# Patient Record
Sex: FEMALE | Race: WHITE | NOT HISPANIC OR LATINO | URBAN - METROPOLITAN AREA
[De-identification: names, ages, dates, MRNs, and addresses within clinical notes are randomized per-mention and may not be internally consistent; named-entity substitution may affect disease eponyms.]

---

## 2017-09-15 ENCOUNTER — INPATIENT (INPATIENT)
Facility: HOSPITAL | Age: 82
LOS: 2 days | Discharge: HOME | End: 2017-09-18
Attending: INTERNAL MEDICINE | Admitting: INTERNAL MEDICINE
Payer: MEDICARE

## 2017-09-15 VITALS
RESPIRATION RATE: 16 BRPM | SYSTOLIC BLOOD PRESSURE: 142 MMHG | HEART RATE: 86 BPM | OXYGEN SATURATION: 97 % | TEMPERATURE: 97 F | DIASTOLIC BLOOD PRESSURE: 80 MMHG

## 2017-09-15 DIAGNOSIS — Z98.891 HISTORY OF UTERINE SCAR FROM PREVIOUS SURGERY: Chronic | ICD-10-CM

## 2017-09-15 DIAGNOSIS — K52.9 NONINFECTIVE GASTROENTERITIS AND COLITIS, UNSPECIFIED: ICD-10-CM

## 2017-09-15 LAB
ALBUMIN SERPL ELPH-MCNC: 3.8 G/DL — SIGNIFICANT CHANGE UP (ref 3.3–5)
ALP SERPL-CCNC: 42 U/L — SIGNIFICANT CHANGE UP (ref 40–120)
ALT FLD-CCNC: 15 U/L — SIGNIFICANT CHANGE UP (ref 4–33)
APPEARANCE UR: CLEAR — SIGNIFICANT CHANGE UP
APTT BLD: 40.7 SEC — HIGH (ref 27.5–37.4)
AST SERPL-CCNC: 25 U/L — SIGNIFICANT CHANGE UP (ref 4–32)
BACTERIA # UR AUTO: SIGNIFICANT CHANGE UP
BASE EXCESS BLDV CALC-SCNC: 0.9 MMOL/L — SIGNIFICANT CHANGE UP
BASOPHILS # BLD AUTO: 0.05 K/UL — SIGNIFICANT CHANGE UP (ref 0–0.2)
BASOPHILS NFR BLD AUTO: 0.4 % — SIGNIFICANT CHANGE UP (ref 0–2)
BILIRUB SERPL-MCNC: 0.4 MG/DL — SIGNIFICANT CHANGE UP (ref 0.2–1.2)
BILIRUB UR-MCNC: NEGATIVE — SIGNIFICANT CHANGE UP
BLOOD GAS VENOUS - CREATININE: 0.67 MG/DL — SIGNIFICANT CHANGE UP (ref 0.5–1.3)
BLOOD UR QL VISUAL: NEGATIVE — SIGNIFICANT CHANGE UP
BUN SERPL-MCNC: 16 MG/DL — SIGNIFICANT CHANGE UP (ref 7–23)
CALCIUM SERPL-MCNC: 8.7 MG/DL — SIGNIFICANT CHANGE UP (ref 8.4–10.5)
CHLORIDE BLDV-SCNC: 104 MMOL/L — SIGNIFICANT CHANGE UP (ref 96–108)
CHLORIDE SERPL-SCNC: 100 MMOL/L — SIGNIFICANT CHANGE UP (ref 98–107)
CO2 SERPL-SCNC: 23 MMOL/L — SIGNIFICANT CHANGE UP (ref 22–31)
COLOR SPEC: YELLOW — SIGNIFICANT CHANGE UP
CREAT SERPL-MCNC: 0.75 MG/DL — SIGNIFICANT CHANGE UP (ref 0.5–1.3)
EOSINOPHIL # BLD AUTO: 0.18 K/UL — SIGNIFICANT CHANGE UP (ref 0–0.5)
EOSINOPHIL NFR BLD AUTO: 1.5 % — SIGNIFICANT CHANGE UP (ref 0–6)
GAS PNL BLDV: 133 MMOL/L — LOW (ref 136–146)
GLUCOSE BLDV-MCNC: 115 — HIGH (ref 70–99)
GLUCOSE SERPL-MCNC: 112 MG/DL — HIGH (ref 70–99)
GLUCOSE UR-MCNC: NEGATIVE — SIGNIFICANT CHANGE UP
HCO3 BLDV-SCNC: 24 MMOL/L — SIGNIFICANT CHANGE UP (ref 20–27)
HCT VFR BLD CALC: 39.2 % — SIGNIFICANT CHANGE UP (ref 34.5–45)
HCT VFR BLDV CALC: 39.7 % — SIGNIFICANT CHANGE UP (ref 34.5–45)
HGB BLD-MCNC: 12.7 G/DL — SIGNIFICANT CHANGE UP (ref 11.5–15.5)
HGB BLDV-MCNC: 12.9 G/DL — SIGNIFICANT CHANGE UP (ref 11.5–15.5)
HYALINE CASTS # UR AUTO: SIGNIFICANT CHANGE UP (ref 0–?)
IMM GRANULOCYTES # BLD AUTO: 0.07 # — SIGNIFICANT CHANGE UP
IMM GRANULOCYTES NFR BLD AUTO: 0.6 % — SIGNIFICANT CHANGE UP (ref 0–1.5)
INR BLD: 1.03 — SIGNIFICANT CHANGE UP (ref 0.88–1.17)
KETONES UR-MCNC: SIGNIFICANT CHANGE UP
LACTATE BLDV-MCNC: 1.5 MMOL/L — SIGNIFICANT CHANGE UP (ref 0.5–2)
LEUKOCYTE ESTERASE UR-ACNC: NEGATIVE — SIGNIFICANT CHANGE UP
LIDOCAIN IGE QN: 40.4 U/L — SIGNIFICANT CHANGE UP (ref 7–60)
LYMPHOCYTES # BLD AUTO: 16.7 % — SIGNIFICANT CHANGE UP (ref 13–44)
LYMPHOCYTES # BLD AUTO: 2.01 K/UL — SIGNIFICANT CHANGE UP (ref 1–3.3)
MAGNESIUM SERPL-MCNC: 2.1 MG/DL — SIGNIFICANT CHANGE UP (ref 1.6–2.6)
MCHC RBC-ENTMCNC: 29.3 PG — SIGNIFICANT CHANGE UP (ref 27–34)
MCHC RBC-ENTMCNC: 32.4 % — SIGNIFICANT CHANGE UP (ref 32–36)
MCV RBC AUTO: 90.3 FL — SIGNIFICANT CHANGE UP (ref 80–100)
MONOCYTES # BLD AUTO: 1.02 K/UL — HIGH (ref 0–0.9)
MONOCYTES NFR BLD AUTO: 8.5 % — SIGNIFICANT CHANGE UP (ref 2–14)
MUCOUS THREADS # UR AUTO: SIGNIFICANT CHANGE UP
NEUTROPHILS # BLD AUTO: 8.74 K/UL — HIGH (ref 1.8–7.4)
NEUTROPHILS NFR BLD AUTO: 72.3 % — SIGNIFICANT CHANGE UP (ref 43–77)
NITRITE UR-MCNC: NEGATIVE — SIGNIFICANT CHANGE UP
NRBC # FLD: 0 — SIGNIFICANT CHANGE UP
PCO2 BLDV: 43 MMHG — SIGNIFICANT CHANGE UP (ref 41–51)
PH BLDV: 7.39 PH — SIGNIFICANT CHANGE UP (ref 7.32–7.43)
PH UR: 6 — SIGNIFICANT CHANGE UP (ref 4.6–8)
PLATELET # BLD AUTO: 298 K/UL — SIGNIFICANT CHANGE UP (ref 150–400)
PMV BLD: 9.8 FL — SIGNIFICANT CHANGE UP (ref 7–13)
PO2 BLDV: 29 MMHG — LOW (ref 35–40)
POTASSIUM BLDV-SCNC: 4.5 MMOL/L — SIGNIFICANT CHANGE UP (ref 3.4–4.5)
POTASSIUM SERPL-MCNC: 4.2 MMOL/L — SIGNIFICANT CHANGE UP (ref 3.5–5.3)
POTASSIUM SERPL-SCNC: 4.2 MMOL/L — SIGNIFICANT CHANGE UP (ref 3.5–5.3)
PROT SERPL-MCNC: 6.4 G/DL — SIGNIFICANT CHANGE UP (ref 6–8.3)
PROT UR-MCNC: 20 — SIGNIFICANT CHANGE UP
PROTHROM AB SERPL-ACNC: 11.5 SEC — SIGNIFICANT CHANGE UP (ref 9.8–13.1)
RBC # BLD: 4.34 M/UL — SIGNIFICANT CHANGE UP (ref 3.8–5.2)
RBC # FLD: 14.6 % — HIGH (ref 10.3–14.5)
RBC CASTS # UR COMP ASSIST: SIGNIFICANT CHANGE UP (ref 0–?)
SAO2 % BLDV: 50.8 % — LOW (ref 60–85)
SODIUM SERPL-SCNC: 137 MMOL/L — SIGNIFICANT CHANGE UP (ref 135–145)
SP GR SPEC: 1.02 — SIGNIFICANT CHANGE UP (ref 1–1.03)
SQUAMOUS # UR AUTO: SIGNIFICANT CHANGE UP
UROBILINOGEN FLD QL: 1 E.U. — SIGNIFICANT CHANGE UP (ref 0.1–0.2)
WBC # BLD: 12.07 K/UL — HIGH (ref 3.8–10.5)
WBC # FLD AUTO: 12.07 K/UL — HIGH (ref 3.8–10.5)
WBC UR QL: SIGNIFICANT CHANGE UP (ref 0–?)

## 2017-09-15 RX ORDER — SENNA PLUS 8.6 MG/1
2 TABLET ORAL AT BEDTIME
Qty: 0 | Refills: 0 | Status: DISCONTINUED | OUTPATIENT
Start: 2017-09-15 | End: 2017-09-18

## 2017-09-15 RX ORDER — POLYETHYLENE GLYCOL 3350 17 G/17G
17 POWDER, FOR SOLUTION ORAL
Qty: 0 | Refills: 0 | Status: DISCONTINUED | OUTPATIENT
Start: 2017-09-15 | End: 2017-09-18

## 2017-09-15 RX ORDER — ASPIRIN/CALCIUM CARB/MAGNESIUM 324 MG
81 TABLET ORAL DAILY
Qty: 0 | Refills: 0 | Status: DISCONTINUED | OUTPATIENT
Start: 2017-09-15 | End: 2017-09-18

## 2017-09-15 RX ORDER — SODIUM CHLORIDE 9 MG/ML
1000 INJECTION, SOLUTION INTRAVENOUS
Qty: 0 | Refills: 0 | Status: DISCONTINUED | OUTPATIENT
Start: 2017-09-15 | End: 2017-09-18

## 2017-09-15 RX ORDER — DOCUSATE SODIUM 100 MG
100 CAPSULE ORAL THREE TIMES A DAY
Qty: 0 | Refills: 0 | Status: DISCONTINUED | OUTPATIENT
Start: 2017-09-15 | End: 2017-09-18

## 2017-09-15 RX ORDER — CARVEDILOL PHOSPHATE 80 MG/1
3.12 CAPSULE, EXTENDED RELEASE ORAL EVERY 12 HOURS
Qty: 0 | Refills: 0 | Status: DISCONTINUED | OUTPATIENT
Start: 2017-09-15 | End: 2017-09-18

## 2017-09-15 RX ORDER — POLYETHYLENE GLYCOL 3350 17 G/17G
17 POWDER, FOR SOLUTION ORAL ONCE
Qty: 0 | Refills: 0 | Status: COMPLETED | OUTPATIENT
Start: 2017-09-15 | End: 2017-09-15

## 2017-09-15 RX ORDER — VALSARTAN 80 MG/1
160 TABLET ORAL DAILY
Qty: 0 | Refills: 0 | Status: DISCONTINUED | OUTPATIENT
Start: 2017-09-15 | End: 2017-09-18

## 2017-09-15 RX ORDER — POLYETHYLENE GLYCOL 3350 17 G/17G
17 POWDER, FOR SOLUTION ORAL
Qty: 0 | Refills: 0 | Status: DISCONTINUED | OUTPATIENT
Start: 2017-09-15 | End: 2017-09-15

## 2017-09-15 RX ORDER — ACETAMINOPHEN 500 MG
1000 TABLET ORAL ONCE
Qty: 0 | Refills: 0 | Status: COMPLETED | OUTPATIENT
Start: 2017-09-15 | End: 2017-09-15

## 2017-09-15 RX ORDER — MINERAL OIL
133 OIL (ML) MISCELLANEOUS ONCE
Qty: 0 | Refills: 0 | Status: DISCONTINUED | OUTPATIENT
Start: 2017-09-15 | End: 2017-09-15

## 2017-09-15 RX ORDER — ENOXAPARIN SODIUM 100 MG/ML
30 INJECTION SUBCUTANEOUS EVERY 24 HOURS
Qty: 0 | Refills: 0 | Status: DISCONTINUED | OUTPATIENT
Start: 2017-09-15 | End: 2017-09-18

## 2017-09-15 RX ORDER — SIMVASTATIN 20 MG/1
10 TABLET, FILM COATED ORAL AT BEDTIME
Qty: 0 | Refills: 0 | Status: DISCONTINUED | OUTPATIENT
Start: 2017-09-15 | End: 2017-09-18

## 2017-09-15 RX ORDER — SODIUM CHLORIDE 9 MG/ML
500 INJECTION INTRAMUSCULAR; INTRAVENOUS; SUBCUTANEOUS ONCE
Qty: 0 | Refills: 0 | Status: COMPLETED | OUTPATIENT
Start: 2017-09-15 | End: 2017-09-15

## 2017-09-15 RX ADMIN — SENNA PLUS 2 TABLET(S): 8.6 TABLET ORAL at 22:01

## 2017-09-15 RX ADMIN — SODIUM CHLORIDE 75 MILLILITER(S): 9 INJECTION, SOLUTION INTRAVENOUS at 19:04

## 2017-09-15 RX ADMIN — CARVEDILOL PHOSPHATE 3.12 MILLIGRAM(S): 80 CAPSULE, EXTENDED RELEASE ORAL at 19:04

## 2017-09-15 RX ADMIN — Medication 400 MILLIGRAM(S): at 08:43

## 2017-09-15 RX ADMIN — Medication 1000 MILLIGRAM(S): at 09:30

## 2017-09-15 RX ADMIN — Medication 100 MILLIGRAM(S): at 22:01

## 2017-09-15 RX ADMIN — POLYETHYLENE GLYCOL 3350 17 GRAM(S): 17 POWDER, FOR SOLUTION ORAL at 19:04

## 2017-09-15 RX ADMIN — POLYETHYLENE GLYCOL 3350 17 GRAM(S): 17 POWDER, FOR SOLUTION ORAL at 16:58

## 2017-09-15 RX ADMIN — SODIUM CHLORIDE 75 MILLILITER(S): 9 INJECTION, SOLUTION INTRAVENOUS at 22:02

## 2017-09-15 RX ADMIN — SODIUM CHLORIDE 1000 MILLILITER(S): 9 INJECTION INTRAMUSCULAR; INTRAVENOUS; SUBCUTANEOUS at 08:43

## 2017-09-15 RX ADMIN — SIMVASTATIN 10 MILLIGRAM(S): 20 TABLET, FILM COATED ORAL at 22:01

## 2017-09-15 RX ADMIN — Medication 1 ENEMA: at 15:28

## 2017-09-15 NOTE — ED PROVIDER NOTE - MEDICAL DECISION MAKING DETAILS
89 female with abdominal pain. Differential is broad but includes SBO, volvulus, LBO. Doubt surgical process given H/P but difficult to tell in elders so will to CTAP, basic labs, upright CXR, VBG, dispo pending workup.

## 2017-09-15 NOTE — CONSULT NOTE ADULT - SUBJECTIVE AND OBJECTIVE BOX
Chief Complaint:  Patient is a 89y old  Female who presents with a chief complaint of     HPI: 89 yo woman with HTN and h/o  presenting with constipation and abdominal cramping x5 days. She reports generally having BMs daily or every other day. She reports that she saw her outpatient GI, Dr. Brown who did a rectal exam and put her on simethicone and senna. She then took dulcolax yesterday which caused further abdominal cramps. She denies fevers, chills, nausea or vomiting at home. She presented to the [facility] ED for further evaluation.    The only new medication that she took recently was zyrtec for a recent cold with congestion.     Allergies:  Benadryl (Hives)      Home Medications:    Hospital Medications:  polyethylene glycol 3350 17 Gram(s) Oral Once  mineral oil enema 133 milliLiter(s) Rectal Once      PMHX/PSHX:  Hypertension, unspecified type  H/O:       Family history:  No FHx of colon cancer, colon polyps, or diverticulitis    Social History:     ROS:     General:  No wt loss, fevers, chills, night sweats, fatigue   Eyes:  Good vision, no reported pain  ENT:  No sore throat, pain, runny nose  CV:  No chest pain, SOB, palpitations, orthopnea  Resp:  No cough, SOB, wheezing  GI:  See HPI  :  No pain, bleeding, incontinence, nocturia  Muscle:  No pain, weakness  Neuro:  No weakness, tingling, memory problems  Psych:  No fatigue, insomnia, mood problems, depression  Endocrine:  No cold/heat intolerance  Heme:  No petechiae, ecchymosis, easy bruising  Skin:  No rash, edema      PHYSICAL EXAM:     GENERAL: NAD  HEENT: sclera anicteric  CHEST: CTAB  HEART:  RRR, no MRG, no edema  ABDOMEN:  Soft, mildly-distended, non-tender, hyper-resonance to percussion. no masses, no hepatosplenomegaly  EXTREMITIES:  no cyanosis, or edema  SKIN:  No rash  NEURO:  Alert, orientedx3     Vital Signs:  Vital Signs Last 24 Hrs  T(C): 36.3 (15 Sep 2017 07:43), Max: 36.3 (15 Sep 2017 07:43)  T(F): 97.3 (15 Sep 2017 07:43), Max: 97.3 (15 Sep 2017 07:43)  HR: 74 (15 Sep 2017 16:21) (73 - 86)  BP: 199/58 (15 Sep 2017 16:21) (142/80 - 199/58)  BP(mean): --  RR: 16 (15 Sep 2017 16:21) (16 - 16)  SpO2: 96% (15 Sep 2017 16:21) (96% - 98%)  Daily     Daily     LABS:                        12.7   12.07 )-----------( 298      ( 15 Sep 2017 08:30 )             39.2     09-15    137  |  100  |  16  ----------------------------<  112<H>  4.2   |  23  |  0.75    Ca    8.7      15 Sep 2017 08:30  Mg     2.1     09-15    TPro  6.4  /  Alb  3.8  /  TBili  0.4  /  DBili  x   /  AST  25  /  ALT  15  /  AlkPhos  42  09-15    LIVER FUNCTIONS - ( 15 Sep 2017 08:30 )  Alb: 3.8 g/dL / Pro: 6.4 g/dL / ALK PHOS: 42 u/L / ALT: 15 u/L / AST: 25 u/L / GGT: x           PT/INR - ( 15 Sep 2017 08:30 )   PT: 11.5 SEC;   INR: 1.03          PTT - ( 15 Sep 2017 08:30 )  PTT:40.7 SEC  Urinalysis Basic - ( 15 Sep 2017 09:05 )    Color: YELLOW / Appearance: CLEAR / S.020 / pH: 6.0  Gluc: NEGATIVE / Ketone: TRACE  / Bili: NEGATIVE / Urobili: 1 E.U.   Blood: NEGATIVE / Protein: 20 / Nitrite: NEGATIVE   Leuk Esterase: NEGATIVE / RBC: 0-2 / WBC 2-5   Sq Epi: OCC / Non Sq Epi: x / Bacteria: FEW        Lipase serum40.4      Imaging:  < from: CT Abdomen and Pelvis w/ Oral Cont and w/ IV Cont (09.15.17 @ 10:19) >  BOWEL: Moderate amount of stool in the rectum and sigmoid colon with   adjacent small amount of fluid and stranding. No bowel obstruction.   Colonic diverticulosis. Appendix is within normal limits.    < end of copied text >

## 2017-09-15 NOTE — H&P ADULT - NSHPPHYSICALEXAM_GEN_ALL_CORE
GENERAL: NAD, well-developed  HEAD:  Atraumatic, Normocephalic  EYES: EOMI, PERRLA, conjunctiva and sclera clear  NECK: Supple, No JVD  CHEST/LUNG: Clear to auscultation bilaterally; No wheeze  HEART: Regular rate and rhythm; No murmurs, rubs, or gallops  ABDOMEN: Soft, Nontender, mild distention, Bowel sounds present  EXTREMITIES:  2+ Peripheral Pulses, No clubbing, cyanosis, or edema  PSYCH: AAOx3  NEUROLOGY: non-focal  SKIN: No rashes or lesions Vital Signs Last 24 Hrs  T(C): 36.7 (15 Sep 2017 18:22), Max: 36.7 (15 Sep 2017 18:22)  T(F): 98 (15 Sep 2017 18:22), Max: 98 (15 Sep 2017 18:22)  HR: 78 (15 Sep 2017 18:22) (73 - 86)  BP: 168/78 (15 Sep 2017 18:22) (142/80 - 199/58)  RR: 17 (15 Sep 2017 18:22) (16 - 17)  SpO2: 99% (15 Sep 2017 18:22) (96% - 99%)  GENERAL: NAD, well-developed  HEAD:  Atraumatic, Normocephalic  EYES: EOMI, PERRLA, conjunctiva and sclera clear  NECK: Supple, No JVD  CHEST/LUNG: Clear to auscultation bilaterally; No wheeze  HEART: Regular rate and rhythm; No murmurs, rubs, or gallops  ABDOMEN: Soft, Nontender, mild distention, Bowel sounds present  EXTREMITIES:  2+ Peripheral Pulses, No clubbing, cyanosis, or edema  PSYCH: AAOx3  NEUROLOGY: non-focal  SKIN: No rashes or lesions

## 2017-09-15 NOTE — ED PROVIDER NOTE - ATTENDING CONTRIBUTION TO CARE
I performed a face to face bedside interview with patient regarding history of present illness, review of symptoms and past medical history. I completed an independent physical exam.  I have discussed patient's plan of care.   I agree with note as stated above, having amended the EMR as needed to reflect my findings. I have discussed the assessment and plan of care.  This includes during the time I functioned as the attending physician for this patient.  Attending Contribution to Care: agree with plan of resident, pt stable, ct pos for stercoral colitis, high risk of adverse effects. minimal improvement with miralax, stable for admission.

## 2017-09-15 NOTE — ED PROVIDER NOTE - NS ED ROS FT
GENERAL: No fever or chills, EYES: no change in vision, HEENT: no trouble swallowing or speaking, CARDIAC: no chest pain, PULMONARY: no cough or SOB,  : No changes in urination, SKIN: no rashes, NEURO: no headache,  MSK: No joint pain otherwise as HPI or negative. ~Rajesh Moran M.D., Ph.D. -Resident

## 2017-09-15 NOTE — H&P ADULT - ASSESSMENT
89F HTN HLD with 4 days constipation and CT evidence stercoral colitis    Plan:  Stercoral colitis: aggressive bowel regimen, received enema x 1, miralax.  Will dose miralax twice daily, senna, colace; then repeat enema.  Switch to lactulose if miralax does not work.     HTN: Continue coreg, valsartan, ASA    HLD: continue simvastatin    VTE PPx: IMPROVE score 2, will give lovenox daily VTE ppx 89F HTN HLD with 4 days constipation and CT evidence stercoral colitis    Plan:  Stercoral colitis: aggressive bowel regimen, received enema x 1, miralax.  Will dose miralax twice daily, senna, colace. If no BM would try SMOG enema.  Switch to lactulose tomorrow if miralax does not work. Advance diet as tolerated, will start clears.  Maintenance IVF.    HTN: Continue coreg, valsartan, ASA    HLD: continue simvastatin    VTE PPx: IMPROVE score 2, will give lovenox daily VTE ppx

## 2017-09-15 NOTE — H&P ADULT - NSHPLABSRESULTS_GEN_ALL_CORE
CT A/P: 1. c/w stercoral colitis, no obstruction  2. retroperitoneal lymphadenopathy  3/ osteopenia CT A/P: 1. c/w stercoral colitis, no obstruction  2. retroperitoneal lymphadenopathy  3. osteopenia

## 2017-09-15 NOTE — ED ADULT TRIAGE NOTE - CHIEF COMPLAINT QUOTE
Pt arrives to ED c/o Abd pain to bilateral lower quadrants starting yesterday.  Pt reports cramping since Tuesday.  Pt began taking Senna from her Doctor with no relief.  Pt denies N/V.  Pt reports LBM on Sat/Sunday.  Pt usually goes everyday.  Patients' Gastroenterologist Dr. Silverio Brown called [facility] as per pt for a CT scan.

## 2017-09-15 NOTE — ED PROVIDER NOTE - PHYSICAL EXAMINATION
Gen: NAD, AOx3, non-toxic // Head: NCAT // HEENT: EOMI, oral mucosa moist, normal conjunctiva // Lung: CTAB, no respiratory distress, no wheezes/rhonchi/rales B/L, speaking in full sentences. // CV: RRR, no murmurs, rubs or gallops // Abd: soft, slightly distended, tender throughout mostly in bilateral lower quadrants, no guarding, no CVA tenderness, Rectal: No impaction or gross blood per rectum.  // MSK: no visible deformities // Neuro: No focal sensory or motor deficits // Skin: Warm, well perfused, no rash // Psych: normal affect. ~Rajesh Moran M.D., Ph.D. -Resident

## 2017-09-15 NOTE — H&P ADULT - NSHPREVIEWOFSYSTEMS_GEN_ALL_CORE
CONSTITUTIONAL: No fever, weight loss, or fatigue  EYES: No eye pain, visual disturbances, or discharge  ENMT:  No difficulty hearing, tinnitus, vertigo; No sinus or throat pain  NECK: No pain or stiffness  BREASTS: No pain, masses, or nipple discharge  RESPIRATORY: No cough, wheezing, chills or hemoptysis; No shortness of breath  CARDIOVASCULAR: No chest pain, palpitations, dizziness, or leg swelling  GASTROINTESTINAL: No abdominal or epigastric pain. No nausea, vomiting, or hematemesis. + contsipation as above  GENITOURINARY: No dysuria, frequency, hematuria, or incontinence  NEUROLOGICAL: No headaches, memory loss, loss of strength, numbness, or tremors  SKIN: No itching, burning, rashes, or lesions   LYMPH NODES: No enlarged glands  ENDOCRINE: No heat or cold intolerance; No hair loss  MUSCULOSKELETAL: No joint pain or swelling; No muscle, back, or extremity pain  PSYCHIATRIC: No depression, anxiety, mood swings, or difficulty sleeping  HEME/LYMPH: No easy bruising, or bleeding gums  ALLERY AND IMMUNOLOGIC: No hives or eczema

## 2017-09-15 NOTE — H&P ADULT - HISTORY OF PRESENT ILLNESS
Pt is 89F HTN HLD with 4 days constipation. Had BM 5 days PTA, smaller and harder than usual. Took dulcolax orally 4 days ago, caused cramps. Took glycerine suppository 3 days ago, spoke with Dr Brown who recommended senna, which she took for the past two days with still no BM.  Yetsrday had diffuse abdominal pain and bloated sensation.  Advised by Dr Brown to come to ED for CT scan and labwork.  Pt does not usually have constipation probelms, denies changes in eating or drinking habits, denies dietary changes.  Received enema in ED without BM.  Currently does not have abdominal pain. Pt is 89F HTN HLD with 4 days constipation. Had BM 5 days PTA, smaller and harder than usual. Took dulcolax orally 4 days ago, caused cramps. Took glycerine suppository 3 days ago, spoke with Dr Brown who recommended senna, which she took for the past two days with still no BM.  Yesterday had diffuse abdominal pain and bloated sensation.  Advised by Dr Brown to come to ED for CT scan and labwork.  Pt does not usually have constipation probelms, denies changes in eating or drinking habits, denies dietary changes.  Received fleet enema in ED without BM.  Currently does not have abdominal pain.

## 2017-09-15 NOTE — ED PROVIDER NOTE - OBJECTIVE STATEMENT
89 female history of HTN, HLD, C-sections x2 here for abdominal pain. Onset of pain two days ago, constant, was getting worse overnight, crampy, points at bilateral lower quadrants, last bowel movement was Sunday but passing copious gas. Tried Senna and colace with no relief. Had rectal on Wed by primary doctor and not impacted at the time. Never had this before.

## 2017-09-15 NOTE — CONSULT NOTE ADULT - PROBLEM SELECTOR RECOMMENDATION 9
- stercoral vs diverticulitis  - given leukocytosis would check blood cultures  - would observe patient for at least 24-48 hours  - would have low threshold to treat with abx for acute diverticulitis   - miralax BID and gentle tap water enema for constipation and heavy stool burden in the rectosigmoid colon - stercoral vs diverticulitis  - given leukocytosis would check blood cultures  - would observe patient for at least 24-48 hours  - would have low threshold to treat with abx for acute diverticulitis   - miralax BID for constipation and heavy stool burden in the rectosigmoid colon

## 2017-09-15 NOTE — CONSULT NOTE ADULT - ASSESSMENT
89 yo woman with constipation and crampy abominal discomfort found to have WBC to 12 and CT showing moderate stool burden to the level of the sigmoid colon and diverticulosis with raffy-colonic stranding - ddx is fecal impaction with stercoral colitis vs diverticulitis.

## 2017-09-16 LAB
BUN SERPL-MCNC: 12 MG/DL — SIGNIFICANT CHANGE UP (ref 7–23)
CALCIUM SERPL-MCNC: 8.1 MG/DL — LOW (ref 8.4–10.5)
CHLORIDE SERPL-SCNC: 103 MMOL/L — SIGNIFICANT CHANGE UP (ref 98–107)
CO2 SERPL-SCNC: 21 MMOL/L — LOW (ref 22–31)
CREAT SERPL-MCNC: 0.62 MG/DL — SIGNIFICANT CHANGE UP (ref 0.5–1.3)
GLUCOSE SERPL-MCNC: 76 MG/DL — SIGNIFICANT CHANGE UP (ref 70–99)
HCT VFR BLD CALC: 35.6 % — SIGNIFICANT CHANGE UP (ref 34.5–45)
HGB BLD-MCNC: 11.4 G/DL — LOW (ref 11.5–15.5)
MCHC RBC-ENTMCNC: 28.4 PG — SIGNIFICANT CHANGE UP (ref 27–34)
MCHC RBC-ENTMCNC: 32 % — SIGNIFICANT CHANGE UP (ref 32–36)
MCV RBC AUTO: 88.6 FL — SIGNIFICANT CHANGE UP (ref 80–100)
NRBC # FLD: 0 — SIGNIFICANT CHANGE UP
PLATELET # BLD AUTO: 274 K/UL — SIGNIFICANT CHANGE UP (ref 150–400)
PMV BLD: 9.9 FL — SIGNIFICANT CHANGE UP (ref 7–13)
POTASSIUM SERPL-MCNC: 3.9 MMOL/L — SIGNIFICANT CHANGE UP (ref 3.5–5.3)
POTASSIUM SERPL-SCNC: 3.9 MMOL/L — SIGNIFICANT CHANGE UP (ref 3.5–5.3)
RBC # BLD: 4.02 M/UL — SIGNIFICANT CHANGE UP (ref 3.8–5.2)
RBC # FLD: 14.7 % — HIGH (ref 10.3–14.5)
SODIUM SERPL-SCNC: 139 MMOL/L — SIGNIFICANT CHANGE UP (ref 135–145)
SPECIMEN SOURCE: SIGNIFICANT CHANGE UP
SPECIMEN SOURCE: SIGNIFICANT CHANGE UP
WBC # BLD: 9.02 K/UL — SIGNIFICANT CHANGE UP (ref 3.8–10.5)
WBC # FLD AUTO: 9.02 K/UL — SIGNIFICANT CHANGE UP (ref 3.8–10.5)

## 2017-09-16 RX ORDER — LACTULOSE 10 G/15ML
10 SOLUTION ORAL
Qty: 0 | Refills: 0 | Status: DISCONTINUED | OUTPATIENT
Start: 2017-09-16 | End: 2017-09-17

## 2017-09-16 RX ADMIN — CARVEDILOL PHOSPHATE 3.12 MILLIGRAM(S): 80 CAPSULE, EXTENDED RELEASE ORAL at 18:51

## 2017-09-16 RX ADMIN — VALSARTAN 160 MILLIGRAM(S): 80 TABLET ORAL at 05:26

## 2017-09-16 RX ADMIN — Medication 81 MILLIGRAM(S): at 12:54

## 2017-09-16 RX ADMIN — Medication 100 MILLIGRAM(S): at 14:56

## 2017-09-16 RX ADMIN — ENOXAPARIN SODIUM 30 MILLIGRAM(S): 100 INJECTION SUBCUTANEOUS at 05:27

## 2017-09-16 RX ADMIN — Medication 100 MILLIGRAM(S): at 05:27

## 2017-09-16 RX ADMIN — POLYETHYLENE GLYCOL 3350 17 GRAM(S): 17 POWDER, FOR SOLUTION ORAL at 05:26

## 2017-09-16 RX ADMIN — LACTULOSE 10 GRAM(S): 10 SOLUTION ORAL at 14:57

## 2017-09-16 RX ADMIN — CARVEDILOL PHOSPHATE 3.12 MILLIGRAM(S): 80 CAPSULE, EXTENDED RELEASE ORAL at 05:28

## 2017-09-16 NOTE — PROGRESS NOTE ADULT - SUBJECTIVE AND OBJECTIVE BOX
Patient is a 89y old  Female who presents with a chief complaint of constipation (15 Sep 2017 17:38)      SUBJECTIVE / OVERNIGHT EVENTS:    MEDICATIONS  (STANDING):  aspirin enteric coated 81 milliGRAM(s) Oral daily  valsartan 160 milliGRAM(s) Oral daily  simvastatin 10 milliGRAM(s) Oral at bedtime  carvedilol 3.125 milliGRAM(s) Oral every 12 hours  polyethylene glycol 3350 17 Gram(s) Oral two times a day  docusate sodium 100 milliGRAM(s) Oral three times a day  senna 2 Tablet(s) Oral at bedtime  enoxaparin Injectable 30 milliGRAM(s) SubCutaneous every 24 hours  dextrose 5% + sodium chloride 0.45%. 1000 milliLiter(s) (75 mL/Hr) IV Continuous <Continuous>  lactulose Syrup 10 Gram(s) Oral two times a day    MEDICATIONS  (PRN):  bisacodyl Suppository 10 milliGRAM(s) Rectal daily PRN Constipation      Vital Signs Last 24 Hrs  T(C): 36.5 (16 Sep 2017 13:02), Max: 36.8 (15 Sep 2017 22:07)  T(F): 97.7 (16 Sep 2017 13:02), Max: 98.2 (15 Sep 2017 22:07)  HR: 70 (16 Sep 2017 13:02) (68 - 78)  BP: 147/63 (16 Sep 2017 13:02) (132/67 - 168/78)  BP(mean): --  RR: 19 (16 Sep 2017 13:02) (17 - 19)  SpO2: 99% (16 Sep 2017 13:02) (95% - 99%)  CAPILLARY BLOOD GLUCOSE        I&O's Summary      PHYSICAL EXAM:  GENERAL: NAD, well-developed  HEAD:  Atraumatic, Normocephalic  EYES: EOMI, PERRLA, conjunctiva and sclera clear  NECK: Supple, No JVD  CHEST/LUNG: Clear to auscultation bilaterally; No wheeze  HEART: Regular rate and rhythm; No murmurs, rubs, or gallops  ABDOMEN: Soft, Nontender, Nondistended; Bowel sounds present  EXTREMITIES:  2+ Peripheral Pulses, No clubbing, cyanosis, or edema  PSYCH: AAOx3  NEUROLOGY: non-focal  SKIN: No rashes or lesions    LABS:                        11.4   9.02  )-----------( 274      ( 16 Sep 2017 05:15 )             35.6     09-16    139  |  103  |  12  ----------------------------<  76  3.9   |  21<L>  |  0.62    Ca    8.1<L>      16 Sep 2017 05:15  Mg     2.1     -15    TPro  6.4  /  Alb  3.8  /  TBili  0.4  /  DBili  x   /  AST  25  /  ALT  15  /  AlkPhos  42  09-15    PT/INR - ( 15 Sep 2017 08:30 )   PT: 11.5 SEC;   INR: 1.03          PTT - ( 15 Sep 2017 08:30 )  PTT:40.7 SEC      Urinalysis Basic - ( 15 Sep 2017 09:05 )    Color: YELLOW / Appearance: CLEAR / S.020 / pH: 6.0  Gluc: NEGATIVE / Ketone: TRACE  / Bili: NEGATIVE / Urobili: 1 E.U.   Blood: NEGATIVE / Protein: 20 / Nitrite: NEGATIVE   Leuk Esterase: NEGATIVE / RBC: 0-2 / WBC 2-5   Sq Epi: OCC / Non Sq Epi: x / Bacteria: FEW        RADIOLOGY & ADDITIONAL TESTS:    Imaging Personally Reviewed:    Consultant(s) Notes Reviewed:      Care Discussed with Consultants/Other Providers: Patient is a 89y old  Female who presents with a chief complaint of constipation (15 Sep 2017 17:38)      SUBJECTIVE / OVERNIGHT EVENTS: patient seen and examined by bedside, abdominal cramp resolved , had  2-3 small bowel movement , denies nausea vomiting, fever, chills         MEDICATIONS  (STANDING):  aspirin enteric coated 81 milliGRAM(s) Oral daily  valsartan 160 milliGRAM(s) Oral daily  simvastatin 10 milliGRAM(s) Oral at bedtime  carvedilol 3.125 milliGRAM(s) Oral every 12 hours  polyethylene glycol 3350 17 Gram(s) Oral two times a day  docusate sodium 100 milliGRAM(s) Oral three times a day  senna 2 Tablet(s) Oral at bedtime  enoxaparin Injectable 30 milliGRAM(s) SubCutaneous every 24 hours  dextrose 5% + sodium chloride 0.45%. 1000 milliLiter(s) (75 mL/Hr) IV Continuous <Continuous>  lactulose Syrup 10 Gram(s) Oral two times a day    MEDICATIONS  (PRN):  bisacodyl Suppository 10 milliGRAM(s) Rectal daily PRN Constipation      Vital Signs Last 24 Hrs  T(C): 36.5 (16 Sep 2017 13:02), Max: 36.8 (15 Sep 2017 22:07)  T(F): 97.7 (16 Sep 2017 13:02), Max: 98.2 (15 Sep 2017 22:07)  HR: 70 (16 Sep 2017 13:02) (68 - 78)  BP: 147/63 (16 Sep 2017 13:02) (132/67 - 168/78)  BP(mean): --  RR: 19 (16 Sep 2017 13:02) (17 - 19)  SpO2: 99% (16 Sep 2017 13:02) (95% - 99%)  CAPILLARY BLOOD GLUCOSE        I&O's Summary      PHYSICAL EXAM:  GENERAL: NAD, well-developed  HEAD:  Atraumatic, Normocephalic  EYES: EOMI, PERRLA, conjunctiva and sclera clear  NECK: Supple,   CHEST/LUNG: Clear to auscultation bilaterally; No wheeze  HEART: Regular rate and rhythm;   ABDOMEN: Soft, Nontender, Nondistended; Bowel sounds present  EXTREMITIES:  No clubbing, cyanosis, or edema  PSYCH: AAOx3  NEUROLOGY: non-focal  SKIN: No rashes or lesions    LABS:                        11.4   9.02  )-----------( 274      ( 16 Sep 2017 05:15 )             35.6     09-16    139  |  103  |  12  ----------------------------<  76  3.9   |  21<L>  |  0.62    Ca    8.1<L>      16 Sep 2017 05:15  Mg     2.1     09-15    TPro  6.4  /  Alb  3.8  /  TBili  0.4  /  DBili  x   /  AST  25  /  ALT  15  /  AlkPhos  42  09-15    PT/INR - ( 15 Sep 2017 08:30 )   PT: 11.5 SEC;   INR: 1.03          PTT - ( 15 Sep 2017 08:30 )  PTT:40.7 SEC      Urinalysis Basic - ( 15 Sep 2017 09:05 )    Color: YELLOW / Appearance: CLEAR / S.020 / pH: 6.0  Gluc: NEGATIVE / Ketone: TRACE  / Bili: NEGATIVE / Urobili: 1 E.U.   Blood: NEGATIVE / Protein: 20 / Nitrite: NEGATIVE   Leuk Esterase: NEGATIVE / RBC: 0-2 / WBC 2-5   Sq Epi: OCC / Non Sq Epi: x / Bacteria: FEW        RADIOLOGY & ADDITIONAL TESTS:    Imaging Personally Reviewed:    Consultant(s) Notes Reviewed:   Gi    Care Discussed with Consultants/Other Providers:

## 2017-09-16 NOTE — CHART NOTE - NSCHARTNOTEFT_GEN_A_CORE
Had discussion with pts son Dr Abdalla at length regarding pts condition and plan of care. Son verb concern that pt not moving her bowels and having cramping pain. As per pt she is passing gas, has pos BS, denies n/v, tolerating Clears. Case discussed with attending Dr Wakefield and GI team. Abd X-Ray ordered, will cont to monitor pts clinical status.

## 2017-09-16 NOTE — PROGRESS NOTE ADULT - ASSESSMENT
89F HTN HLD with 4 days constipation and CT evidence stercoral colitis    Plan:  Stercoral colitis: aggressive bowel regimen, received enema x 1,in ED .  Will continue  MiraLax twice daily, senna, colace. Monitor BM, pt had 2-3 small BM, Gi consult note, doubt diverticulitis as pt afebrile, pain improved, will monitor off Abx   HTN: Continue coreg, valsartan, ASA  BP within goal    HLD: continue simvastatin    VTE PPx: IMPROVE score 2, will give lovenox daily VTE ppx  plan of care d/w pt

## 2017-09-17 LAB — BACTERIA UR CULT: SIGNIFICANT CHANGE UP

## 2017-09-17 RX ADMIN — POLYETHYLENE GLYCOL 3350 17 GRAM(S): 17 POWDER, FOR SOLUTION ORAL at 17:58

## 2017-09-17 RX ADMIN — Medication 81 MILLIGRAM(S): at 12:39

## 2017-09-17 RX ADMIN — SODIUM CHLORIDE 75 MILLILITER(S): 9 INJECTION, SOLUTION INTRAVENOUS at 17:17

## 2017-09-17 RX ADMIN — Medication 100 MILLIGRAM(S): at 21:39

## 2017-09-17 RX ADMIN — CARVEDILOL PHOSPHATE 3.12 MILLIGRAM(S): 80 CAPSULE, EXTENDED RELEASE ORAL at 17:58

## 2017-09-17 RX ADMIN — Medication 100 MILLIGRAM(S): at 14:52

## 2017-09-17 RX ADMIN — ENOXAPARIN SODIUM 30 MILLIGRAM(S): 100 INJECTION SUBCUTANEOUS at 05:33

## 2017-09-17 RX ADMIN — Medication 100 MILLIGRAM(S): at 05:33

## 2017-09-17 RX ADMIN — Medication 10 MILLIGRAM(S): at 12:40

## 2017-09-17 RX ADMIN — CARVEDILOL PHOSPHATE 3.12 MILLIGRAM(S): 80 CAPSULE, EXTENDED RELEASE ORAL at 05:33

## 2017-09-17 RX ADMIN — SIMVASTATIN 10 MILLIGRAM(S): 20 TABLET, FILM COATED ORAL at 21:39

## 2017-09-17 RX ADMIN — VALSARTAN 160 MILLIGRAM(S): 80 TABLET ORAL at 05:32

## 2017-09-17 RX ADMIN — SENNA PLUS 2 TABLET(S): 8.6 TABLET ORAL at 21:39

## 2017-09-17 RX ADMIN — POLYETHYLENE GLYCOL 3350 17 GRAM(S): 17 POWDER, FOR SOLUTION ORAL at 05:32

## 2017-09-17 NOTE — PROGRESS NOTE ADULT - ASSESSMENT
89F HTN HLD with 4 days constipation and CT evidence stercoral colitis    Plan:  Stercoral colitis: aggressive bowel regimen, received enema x 1,in ED .  Will continue  MiraLax twice daily, senna, colace. Monitor BM, pt had 2-3 small BM, Gi consult note, doubt diverticulitis as pt afebrile, pain improved, will monitor off Abx   HTN: Continue coreg, valsartan, ASA  BP within goal    HLD: continue simvastatin    VTE PPx: IMPROVE score 2, will give lovenox daily VTE ppx  plan of care d/w pt 89F HTN HLD with 4 days constipation and CT evidence stercoral colitis    Plan:  Stercoral colitis: aggressive bowel regimen, received enema x 1,in ED .  Will continue  MiraLax twice daily, senna, colace. Monitor BM, pt had  small BM, Gi consult noted  doubt diverticulitis as pt afebrile, pain improved, will monitor off Abx   Pt had developed cramps with lactulose, will avoid lactulose   HTN: Continue coreg, valsartan, ASA  BP within goal    HLD: continue simvastatin    VTE PPx: IMPROVE score 2, will give lovenox daily VTE ppx  plan of care d/w pt and son at bedside

## 2017-09-17 NOTE — PROGRESS NOTE ADULT - SUBJECTIVE AND OBJECTIVE BOX
Patient is a 89y old  Female who presents with a chief complaint of constipation (15 Sep 2017 17:38)      SUBJECTIVE / OVERNIGHT EVENTS: patient seen and examined by bedside, had moved bowels  but not a large one, also had rectal exam done by GI for possible impaction . Pt had severe cramps yesterday after lactulose, cramps resolved now , denies nausea , vomiting       MEDICATIONS  (STANDING):  aspirin enteric coated 81 milliGRAM(s) Oral daily  valsartan 160 milliGRAM(s) Oral daily  simvastatin 10 milliGRAM(s) Oral at bedtime  carvedilol 3.125 milliGRAM(s) Oral every 12 hours  polyethylene glycol 3350 17 Gram(s) Oral two times a day  docusate sodium 100 milliGRAM(s) Oral three times a day  senna 2 Tablet(s) Oral at bedtime  enoxaparin Injectable 30 milliGRAM(s) SubCutaneous every 24 hours  dextrose 5% + sodium chloride 0.45%. 1000 milliLiter(s) (75 mL/Hr) IV Continuous <Continuous>    MEDICATIONS  (PRN):  bisacodyl Suppository 10 milliGRAM(s) Rectal daily PRN Constipation      Vital Signs Last 24 Hrs  T(C): 36.9 (17 Sep 2017 13:47), Max: 36.9 (17 Sep 2017 13:47)  T(F): 98.5 (17 Sep 2017 13:47), Max: 98.5 (17 Sep 2017 13:47)  HR: 763 (17 Sep 2017 13:47) (77 - 763)  BP: 145/67 (17 Sep 2017 13:47) (138/74 - 179/83)  BP(mean): --  RR: 18 (17 Sep 2017 13:47) (18 - 18)  SpO2: 98% (17 Sep 2017 13:47) (96% - 98%)  CAPILLARY BLOOD GLUCOSE        I&O's Summary      PHYSICAL EXAM:  GENERAL: NAD, well-developed  HEAD:  Atraumatic, Normocephalic  EYES: EOMI, PERRLA, conjunctiva and sclera clear  NECK: Supple,   CHEST/LUNG: Clear to auscultation bilaterally; No wheeze  HEART: Regular rate and rhythm;   ABDOMEN: Soft, Nontender, Nondistended; Bowel sounds present  EXTREMITIES:  No clubbing, cyanosis, or edema  PSYCH: AAOx3  NEUROLOGY: non-focal  SKIN: No rashes or lesions      LABS:                        11.4   9.02  )-----------( 274      ( 16 Sep 2017 05:15 )             35.6     09-16    139  |  103  |  12  ----------------------------<  76  3.9   |  21<L>  |  0.62    Ca    8.1<L>      16 Sep 2017 05:15                RADIOLOGY & ADDITIONAL TESTS: < from: Xray Abdomen 1 View-Upright Only (09.16.17 @ 20:26) >   RAD ABDOMEN (1 VIEW)      < end of copied text >  < from: Xray Abdomen 1 View-Upright Only (09.16.17 @ 20:26) >  There is a nonobstructive intestinal gas pattern with moderate distention   of the right side of the colon and cecum. No masses or pathologic   calcifications. No free air.      COMPARISON:  None available      IMPRESSION:  Nonobstructive intestinal distention.            < end of copied text >      Imaging Personally Reviewed:    Consultant(s) Notes Reviewed:      Care Discussed with Consultants/Other Providers:

## 2017-09-18 ENCOUNTER — TRANSCRIPTION ENCOUNTER (OUTPATIENT)
Age: 82
End: 2017-09-18

## 2017-09-18 VITALS
OXYGEN SATURATION: 98 % | SYSTOLIC BLOOD PRESSURE: 142 MMHG | HEART RATE: 82 BPM | DIASTOLIC BLOOD PRESSURE: 72 MMHG | RESPIRATION RATE: 17 BRPM | TEMPERATURE: 99 F

## 2017-09-18 DIAGNOSIS — K59.00 CONSTIPATION, UNSPECIFIED: ICD-10-CM

## 2017-09-18 RX ADMIN — Medication 100 MILLIGRAM(S): at 05:31

## 2017-09-18 RX ADMIN — POLYETHYLENE GLYCOL 3350 17 GRAM(S): 17 POWDER, FOR SOLUTION ORAL at 05:31

## 2017-09-18 RX ADMIN — SODIUM CHLORIDE 75 MILLILITER(S): 9 INJECTION, SOLUTION INTRAVENOUS at 05:32

## 2017-09-18 RX ADMIN — ENOXAPARIN SODIUM 30 MILLIGRAM(S): 100 INJECTION SUBCUTANEOUS at 05:31

## 2017-09-18 RX ADMIN — VALSARTAN 160 MILLIGRAM(S): 80 TABLET ORAL at 05:31

## 2017-09-18 RX ADMIN — Medication 81 MILLIGRAM(S): at 13:06

## 2017-09-18 RX ADMIN — CARVEDILOL PHOSPHATE 3.12 MILLIGRAM(S): 80 CAPSULE, EXTENDED RELEASE ORAL at 05:31

## 2017-09-18 NOTE — PROGRESS NOTE ADULT - PROBLEM SELECTOR PLAN 1
- continue bowel regimen with miralax BID, titrated as needed  - regular diet - continue bowel regimen with miralax BID, titrated as needed  - regular diet  - no barriers to discharge from GI standpoint

## 2017-09-18 NOTE — PROGRESS NOTE ADULT - ASSESSMENT
89 yo woman with constipation and lower abd discomfort with mild leukocytosis upon admission and CT showing stercoralcolitis vs diverticulitis - more likely stercoralcolitis. Now patient with large BM and diarrhea, clinically improved and no evidence of systemic infection at this time.

## 2017-09-18 NOTE — DISCHARGE NOTE ADULT - PATIENT PORTAL LINK FT
“You can access the [employer] Patient Portal, offered by [facility], by registering with the following website: [email or web address]”

## 2017-09-18 NOTE — DISCHARGE NOTE ADULT - PLAN OF CARE
bowel movement Constipation resolved in the hospital. Please keep hydrated, and incorporate high fiber in your diet as advised. Follow up with your PCP Dr. Oakley [phone] for further recommendations within 1-2 weeks. Continue with home medications. CT abdomen and pelvis showed no acute findings. Xray of abdomen ruled no obstruction. Blood culture and urine cultures were negative. Please continue with bowel regimen as recommended. Follow up with your PCP Dr. Oakley [phone] for further recommendations within 1-2 weeks.

## 2017-09-18 NOTE — PROGRESS NOTE ADULT - SUBJECTIVE AND OBJECTIVE BOX
Patient is a 89y old  Female who presents with a chief complaint of constipation (15 Sep 2017 17:38)      SUBJECTIVE / OVERNIGHT EVENTS:  Pt ambulating. + BM. tolerates diet. no complaints. feels ready to go home    MEDICATIONS  (STANDING):  aspirin enteric coated 81 milliGRAM(s) Oral daily  valsartan 160 milliGRAM(s) Oral daily  simvastatin 10 milliGRAM(s) Oral at bedtime  carvedilol 3.125 milliGRAM(s) Oral every 12 hours  polyethylene glycol 3350 17 Gram(s) Oral two times a day  docusate sodium 100 milliGRAM(s) Oral three times a day  senna 2 Tablet(s) Oral at bedtime  enoxaparin Injectable 30 milliGRAM(s) SubCutaneous every 24 hours    MEDICATIONS  (PRN):  bisacodyl Suppository 10 milliGRAM(s) Rectal daily PRN Constipation      T(C): 37 (09-18-17 @ 05:27), Max: 37.1 (09-17-17 @ 19:58)  HR: 69 (09-18-17 @ 05:27) (69 - 73)  BP: 146/79 (09-18-17 @ 05:27) (144/70 - 148/78)  RR: 18 (09-18-17 @ 05:27) (17 - 18)  SpO2: 98% (09-18-17 @ 05:27) (97% - 98%)  CAPILLARY BLOOD GLUCOSE        I&O's Summary      PHYSICAL EXAM:  GENERAL: NAD, well-developed  HEAD:  Atraumatic, Normocephalic  EYES: EOMI, PERRLA, conjunctiva and sclera clear  NECK: Supple, No JVD  CHEST/LUNG: Clear to auscultation bilaterally; No wheeze  HEART: s1 s2  ABDOMEN: Soft, Nontender, Nondistended; Bowel sounds present  EXTREMITIES:  No clubbing, cyanosis, or edema  PSYCH: awake, alert, calm   NEUROLOGY: non-focal  SKIN: No rashes or lesions    LABS:                    RADIOLOGY & ADDITIONAL TESTS:    Imaging Personally Reviewed:    Consultant(s) Notes Reviewed:      Care Discussed with Consultants/Other Providers:

## 2017-09-18 NOTE — DISCHARGE NOTE ADULT - CARE PLAN
Principal Discharge DX:	Constipation, unspecified constipation type  Goal:	bowel movement  Instructions for follow-up, activity and diet:	Constipation resolved in the hospital. Please keep hydrated, and incorporate high fiber in your diet as advised. Follow up with your PCP Dr. Oakley [phone] for further recommendations within 1-2 weeks.  Secondary Diagnosis:	Hyperlipidemia  Instructions for follow-up, activity and diet:	Continue with home medications.  Secondary Diagnosis:	Hypertension, unspecified type  Instructions for follow-up, activity and diet:	Continue with home medications.  Secondary Diagnosis:	Colitis  Instructions for follow-up, activity and diet:	CT abdomen and pelvis showed no acute findings. Xray of abdomen ruled no obstruction. Blood culture and urine cultures were negative. Please continue with bowel regimen as recommended. Follow up with your PCP Dr. Oakley [phone] for further recommendations within 1-2 weeks.

## 2017-09-18 NOTE — PROGRESS NOTE ADULT - ASSESSMENT
89F HTN HLD with 4 days constipation and CT evidence stercoral colitis    Plan:  Stercoral colitis: + BM now. continue  MiraLax twice daily, senna, colace. Hold for loose BM  Pt had developed cramps with lactulose, will avoid lactulose   HTN: Continue coreg, valsartan, ASA. BP is acceptable for her age  HLD: continue simvastatin    plan of care d/w pt and son and daughter at bedside. Plan for discharge. Time 40 min

## 2017-09-18 NOTE — DISCHARGE NOTE ADULT - HOSPITAL COURSE
89y Female complaining of abdominal pain.    Hospital Course:     Stercoral colitis  - UA- neg, UCx- neg, BCx neg   - CT AP- Moderate amount of stool in the rectum and sigmoid colon. Suspect   stercoral colitis. Mild retroperitoneal lymphadenopathy of indeterminate etiology.  - GI (house) consulted  - aggressive bowel regimen, received enema x 1  - Miralax twice daily, senna, colace, Lactulose    - Abd Xray r/o Obstruction Nonobstructive intestinal distention.  -f/u PCP for further recs     HTN  - Continue Coreg, Valsartan, ASA    HLD  -continue Simvastatin    Dispo: Home 89y Female complaining of abdominal pain.    Hospital Course:     Stercoral colitis  - UA- neg, UCx- neg, BCx neg   - CT AP- Moderate amount of stool in the rectum and sigmoid colon. Suspect   stercoral colitis. Mild retroperitoneal lymphadenopathy of indeterminate etiology.  - GI (house) consulted  - aggressive bowel regimen, received enema x 1  - Miralax twice daily, senna, colace, Lactulose    - Abd Xray r/o Obstruction Nonobstructive intestinal distention.  -f/u PCP for further recs   -pt had diarrhea on last day of admission so was told to hold off on bowel regimen for now but to resume if becomes constipated. Pt was educated to keep hydrated, eat a balanced diet, and keep active.     HTN  - Continue Coreg, Valsartan, ASA    HLD  -continue Simvastatin    Dispo: Home

## 2017-09-18 NOTE — DISCHARGE NOTE ADULT - MEDICATION SUMMARY - MEDICATIONS TO TAKE
I will START or STAY ON the medications listed below when I get home from the hospital:    Aspirin Enteric Coated 81 mg oral delayed release tablet  -- 1 tab(s) by mouth once a day  -- Indication: For prophylactic     valsartan 160 mg oral tablet  -- 1 tab(s) by mouth once a day  -- Indication: For Hypertension, unspecified type    Caltrate 600 mg oral tablet  -- 2 tab(s) by mouth once a day  -- Indication: For Colitis    simvastatin 10 mg oral tablet  -- 1 tab(s) by mouth once a day (at bedtime)  -- Indication: For Hyperlipidemia    carvedilol 3.125 mg oral tablet  -- 1 tab(s) by mouth 2 times a day  -- Indication: For Hypertension, unspecified type    PreserVision oral tablet  -- 1 tab(s) by mouth once a day  -- Indication: For supplement    Vitamin D3 1000 intl units oral capsule  -- 3 cap(s) by mouth once a day  -- Indication: For supplement    Vitamin B12 250 mcg oral tablet  -- 1 tab(s) by mouth once a day  -- Indication: For supplement

## 2017-09-18 NOTE — PROGRESS NOTE ADULT - SUBJECTIVE AND OBJECTIVE BOX
Chief Complaint:  Patient is a 89y old  Female who presents with a chief complaint of constipation (15 Sep 2017 17:38)      Interval Events:     Allergies:  Benadryl (Hives)      [facility] Medications:  aspirin enteric coated 81 milliGRAM(s) Oral daily  valsartan 160 milliGRAM(s) Oral daily  simvastatin 10 milliGRAM(s) Oral at bedtime  carvedilol 3.125 milliGRAM(s) Oral every 12 hours  polyethylene glycol 3350 17 Gram(s) Oral two times a day  docusate sodium 100 milliGRAM(s) Oral three times a day  senna 2 Tablet(s) Oral at bedtime  enoxaparin Injectable 30 milliGRAM(s) SubCutaneous every 24 hours  dextrose 5% + sodium chloride 0.45%. 1000 milliLiter(s) IV Continuous <Continuous>  bisacodyl Suppository 10 milliGRAM(s) Rectal daily PRN      PMHX/PSHX:  Hyperlipidemia  Hypertension, unspecified type  H/O:       Family history:  No pertinent family history in first degree relatives      ROS:     General:  No wt loss, fevers, chills, night sweats, fatigue   Eyes:  Good vision, no reported pain  ENT:  No sore throat, pain, runny nose  CV:  No chest pain, palpitations  Resp:  No cough, wheezing, SOB  GI:  See HPI  :  No pain, bleeding, incontinence, nocturia  Muscle:  No pain, weakness  Neuro:  No weakness, tingling, memory problems  Psych:  No fatigue, insomnia, mood problems, depression  Endocrine:  No cold/heat intolerance  Heme:  No petechiae, ecchymosis, easy bruising  Skin:  No rash, edema      PHYSICAL EXAM:   Vital Signs:  Vital Signs Last 24 Hrs  T(C): 37 (18 Sep 2017 05:27), Max: 37.1 (17 Sep 2017 19:58)  T(F): 98.6 (18 Sep 2017 05:27), Max: 98.7 (17 Sep 2017 19:58)  HR: 69 (18 Sep 2017 05:27) (69 - 73)  BP: 146/79 (18 Sep 2017 05:27) (144/70 - 148/78)  BP(mean): --  RR: 18 (18 Sep 2017 05:27) (17 - 18)  SpO2: 98% (18 Sep 2017 05:27) (97% - 98%)  Daily     Daily     GENERAL:  NAD  HEENT:  sclera anicteric  CHEST:  no respiratory distress, CTAB  HEART:  RRR, no MRG, no edema  ABDOMEN:  Soft, non-tender, non-distended, no masses , no hepato-splenomegaly  EXTREMITIES:  no cyanosis, no edema  SKIN:  No rash  NEURO:  Alert, oriented      LABS:                  Imaging: Chief Complaint:  Patient is a 89y old  Female who presents with a chief complaint of constipation (15 Sep 2017 17:38)      Interval Events: Patient has had large BM last night and subsequent diarrhea. She has not received any abx. No fevers. Abd discomfort is resolved.     Allergies:  Benadryl (Hives)      Hospital Medications:  aspirin enteric coated 81 milliGRAM(s) Oral daily  valsartan 160 milliGRAM(s) Oral daily  simvastatin 10 milliGRAM(s) Oral at bedtime  carvedilol 3.125 milliGRAM(s) Oral every 12 hours  polyethylene glycol 3350 17 Gram(s) Oral two times a day  docusate sodium 100 milliGRAM(s) Oral three times a day  senna 2 Tablet(s) Oral at bedtime  enoxaparin Injectable 30 milliGRAM(s) SubCutaneous every 24 hours  dextrose 5% + sodium chloride 0.45%. 1000 milliLiter(s) IV Continuous <Continuous>  bisacodyl Suppository 10 milliGRAM(s) Rectal daily PRN      PMHX/PSHX:  Hyperlipidemia  Hypertension, unspecified type  H/O:       Family history:  No pertinent family history in first degree relatives      ROS:     General:  No wt loss, fevers, chills, night sweats, fatigue   Eyes:  Good vision, no reported pain  ENT:  No sore throat, pain, runny nose  CV:  No chest pain, palpitations  Resp:  No cough, wheezing, SOB  GI:  See HPI  :  No pain, bleeding, incontinence, nocturia  Muscle:  No pain, weakness  Neuro:  No weakness, tingling, memory problems  Psych:  No fatigue, insomnia, mood problems, depression  Endocrine:  No cold/heat intolerance  Heme:  No petechiae, ecchymosis, easy bruising  Skin:  No rash, edema      PHYSICAL EXAM:   Vital Signs:  Vital Signs Last 24 Hrs  T(C): 37 (18 Sep 2017 05:27), Max: 37.1 (17 Sep 2017 19:58)  T(F): 98.6 (18 Sep 2017 05:27), Max: 98.7 (17 Sep 2017 19:58)  HR: 69 (18 Sep 2017 05:27) (69 - 73)  BP: 146/79 (18 Sep 2017 05:27) (144/70 - 148/78)  BP(mean): --  RR: 18 (18 Sep 2017 05:27) (17 - 18)  SpO2: 98% (18 Sep 2017 05:27) (97% - 98%)  Daily     Daily     GENERAL:  NAD  HEENT:  sclera anicteric  CHEST:  no respiratory distress, CTAB  HEART:  RRR, no MRG, no edema  ABDOMEN:  Soft, non-tender, non-distended, no masses , no hepato-splenomegaly  EXTREMITIES:  no cyanosis, no edema  SKIN:  No rash  NEURO:  Alert, oriented      LABS:            Imaging:

## 2017-09-19 ENCOUNTER — EMERGENCY (EMERGENCY)
Facility: HOSPITAL | Age: 82
LOS: 1 days | Discharge: HOME | End: 2017-09-19
Attending: EMERGENCY MEDICINE | Admitting: EMERGENCY MEDICINE
Payer: MEDICARE

## 2017-09-19 VITALS
HEART RATE: 77 BPM | RESPIRATION RATE: 16 BRPM | DIASTOLIC BLOOD PRESSURE: 56 MMHG | TEMPERATURE: 99 F | OXYGEN SATURATION: 100 % | SYSTOLIC BLOOD PRESSURE: 143 MMHG

## 2017-09-19 DIAGNOSIS — Z98.891 HISTORY OF UTERINE SCAR FROM PREVIOUS SURGERY: Chronic | ICD-10-CM

## 2017-09-19 PROBLEM — I10 ESSENTIAL (PRIMARY) HYPERTENSION: Chronic | Status: ACTIVE | Noted: 2017-09-15

## 2017-09-19 LAB
ALBUMIN SERPL ELPH-MCNC: 3.5 G/DL — SIGNIFICANT CHANGE UP (ref 3.3–5)
ALP SERPL-CCNC: 46 U/L — SIGNIFICANT CHANGE UP (ref 40–120)
ALT FLD-CCNC: 36 U/L — HIGH (ref 4–33)
AST SERPL-CCNC: 54 U/L — HIGH (ref 4–32)
BASE EXCESS BLDV CALC-SCNC: -2.1 MMOL/L — SIGNIFICANT CHANGE UP
BASOPHILS # BLD AUTO: 0.03 K/UL — SIGNIFICANT CHANGE UP (ref 0–0.2)
BASOPHILS NFR BLD AUTO: 0.2 % — SIGNIFICANT CHANGE UP (ref 0–2)
BILIRUB SERPL-MCNC: 0.3 MG/DL — SIGNIFICANT CHANGE UP (ref 0.2–1.2)
BLOOD GAS VENOUS - CREATININE: 0.6 MG/DL — SIGNIFICANT CHANGE UP (ref 0.5–1.3)
BUN SERPL-MCNC: 13 MG/DL — SIGNIFICANT CHANGE UP (ref 7–23)
CALCIUM SERPL-MCNC: 7.8 MG/DL — LOW (ref 8.4–10.5)
CHLORIDE BLDV-SCNC: 103 MMOL/L — SIGNIFICANT CHANGE UP (ref 96–108)
CHLORIDE SERPL-SCNC: 99 MMOL/L — SIGNIFICANT CHANGE UP (ref 98–107)
CO2 SERPL-SCNC: 20 MMOL/L — LOW (ref 22–31)
CREAT SERPL-MCNC: 0.65 MG/DL — SIGNIFICANT CHANGE UP (ref 0.5–1.3)
EOSINOPHIL # BLD AUTO: 0.02 K/UL — SIGNIFICANT CHANGE UP (ref 0–0.5)
EOSINOPHIL NFR BLD AUTO: 0.2 % — SIGNIFICANT CHANGE UP (ref 0–6)
GAS PNL BLDV: 131 MMOL/L — LOW (ref 136–146)
GLUCOSE BLDV-MCNC: 119 — HIGH (ref 70–99)
GLUCOSE SERPL-MCNC: 116 MG/DL — HIGH (ref 70–99)
HCO3 BLDV-SCNC: 22 MMOL/L — SIGNIFICANT CHANGE UP (ref 20–27)
HCT VFR BLD CALC: 35.1 % — SIGNIFICANT CHANGE UP (ref 34.5–45)
HCT VFR BLDV CALC: 37.6 % — SIGNIFICANT CHANGE UP (ref 34.5–45)
HGB BLD-MCNC: 11.7 G/DL — SIGNIFICANT CHANGE UP (ref 11.5–15.5)
HGB BLDV-MCNC: 12.2 G/DL — SIGNIFICANT CHANGE UP (ref 11.5–15.5)
IMM GRANULOCYTES # BLD AUTO: 0.06 # — SIGNIFICANT CHANGE UP
IMM GRANULOCYTES NFR BLD AUTO: 0.5 % — SIGNIFICANT CHANGE UP (ref 0–1.5)
LACTATE BLDV-MCNC: 1.3 MMOL/L — SIGNIFICANT CHANGE UP (ref 0.5–2)
LYMPHOCYTES # BLD AUTO: 1.29 K/UL — SIGNIFICANT CHANGE UP (ref 1–3.3)
LYMPHOCYTES # BLD AUTO: 9.7 % — LOW (ref 13–44)
MCHC RBC-ENTMCNC: 29.1 PG — SIGNIFICANT CHANGE UP (ref 27–34)
MCHC RBC-ENTMCNC: 33.3 % — SIGNIFICANT CHANGE UP (ref 32–36)
MCV RBC AUTO: 87.3 FL — SIGNIFICANT CHANGE UP (ref 80–100)
MONOCYTES # BLD AUTO: 1.04 K/UL — HIGH (ref 0–0.9)
MONOCYTES NFR BLD AUTO: 7.8 % — SIGNIFICANT CHANGE UP (ref 2–14)
NEUTROPHILS # BLD AUTO: 10.83 K/UL — HIGH (ref 1.8–7.4)
NEUTROPHILS NFR BLD AUTO: 81.6 % — HIGH (ref 43–77)
NRBC # FLD: 0 — SIGNIFICANT CHANGE UP
PCO2 BLDV: 40 MMHG — LOW (ref 41–51)
PH BLDV: 7.37 PH — SIGNIFICANT CHANGE UP (ref 7.32–7.43)
PLATELET # BLD AUTO: 277 K/UL — SIGNIFICANT CHANGE UP (ref 150–400)
PMV BLD: 10 FL — SIGNIFICANT CHANGE UP (ref 7–13)
PO2 BLDV: 34 MMHG — LOW (ref 35–40)
POTASSIUM BLDV-SCNC: 3.6 MMOL/L — SIGNIFICANT CHANGE UP (ref 3.4–4.5)
POTASSIUM SERPL-MCNC: 3.7 MMOL/L — SIGNIFICANT CHANGE UP (ref 3.5–5.3)
POTASSIUM SERPL-SCNC: 3.7 MMOL/L — SIGNIFICANT CHANGE UP (ref 3.5–5.3)
PROT SERPL-MCNC: 6.1 G/DL — SIGNIFICANT CHANGE UP (ref 6–8.3)
RBC # BLD: 4.02 M/UL — SIGNIFICANT CHANGE UP (ref 3.8–5.2)
RBC # FLD: 14.3 % — SIGNIFICANT CHANGE UP (ref 10.3–14.5)
SAO2 % BLDV: 59.9 % — LOW (ref 60–85)
SODIUM SERPL-SCNC: 135 MMOL/L — SIGNIFICANT CHANGE UP (ref 135–145)
WBC # BLD: 13.27 K/UL — HIGH (ref 3.8–10.5)
WBC # FLD AUTO: 13.27 K/UL — HIGH (ref 3.8–10.5)

## 2017-09-19 RX ORDER — OXYCODONE HYDROCHLORIDE 5 MG/1
2.5 TABLET ORAL ONCE
Qty: 0 | Refills: 0 | Status: DISCONTINUED | OUTPATIENT
Start: 2017-09-19 | End: 2017-09-19

## 2017-09-19 RX ORDER — SIMVASTATIN 20 MG/1
10 TABLET, FILM COATED ORAL AT BEDTIME
Qty: 0 | Refills: 0 | Status: DISCONTINUED | OUTPATIENT
Start: 2017-09-19 | End: 2017-09-23

## 2017-09-19 RX ORDER — ACETAMINOPHEN 500 MG
1000 TABLET ORAL ONCE
Qty: 0 | Refills: 0 | Status: COMPLETED | OUTPATIENT
Start: 2017-09-19 | End: 2017-09-19

## 2017-09-19 RX ORDER — CARVEDILOL PHOSPHATE 80 MG/1
3.12 CAPSULE, EXTENDED RELEASE ORAL EVERY 12 HOURS
Qty: 0 | Refills: 0 | Status: DISCONTINUED | OUTPATIENT
Start: 2017-09-19 | End: 2017-09-23

## 2017-09-19 RX ORDER — VALSARTAN 80 MG/1
160 TABLET ORAL DAILY
Qty: 0 | Refills: 0 | Status: DISCONTINUED | OUTPATIENT
Start: 2017-09-19 | End: 2017-09-23

## 2017-09-19 RX ORDER — LACTOBACILLUS ACIDOPHILUS 100MM CELL
1 CAPSULE ORAL ONCE
Qty: 0 | Refills: 0 | Status: COMPLETED | OUTPATIENT
Start: 2017-09-19 | End: 2017-09-19

## 2017-09-19 RX ORDER — LACTOBACILLUS ACIDOPHILUS 100MM CELL
1 CAPSULE ORAL DAILY
Qty: 0 | Refills: 0 | Status: DISCONTINUED | OUTPATIENT
Start: 2017-09-20 | End: 2017-09-23

## 2017-09-19 RX ORDER — OXYCODONE AND ACETAMINOPHEN 5; 325 MG/1; MG/1
1 TABLET ORAL EVERY 4 HOURS
Qty: 0 | Refills: 0 | Status: DISCONTINUED | OUTPATIENT
Start: 2017-09-19 | End: 2017-09-19

## 2017-09-19 RX ORDER — KETOROLAC TROMETHAMINE 30 MG/ML
15 SYRINGE (ML) INJECTION ONCE
Qty: 0 | Refills: 0 | Status: DISCONTINUED | OUTPATIENT
Start: 2017-09-19 | End: 2017-09-19

## 2017-09-19 RX ORDER — IBUPROFEN 200 MG
200 TABLET ORAL EVERY 8 HOURS
Qty: 0 | Refills: 0 | Status: DISCONTINUED | OUTPATIENT
Start: 2017-09-19 | End: 2017-09-23

## 2017-09-19 RX ORDER — OXYCODONE AND ACETAMINOPHEN 5; 325 MG/1; MG/1
1 TABLET ORAL ONCE
Qty: 0 | Refills: 0 | Status: DISCONTINUED | OUTPATIENT
Start: 2017-09-19 | End: 2017-09-19

## 2017-09-19 RX ADMIN — SIMVASTATIN 10 MILLIGRAM(S): 20 TABLET, FILM COATED ORAL at 21:02

## 2017-09-19 RX ADMIN — Medication 100 MILLIGRAM(S): at 18:27

## 2017-09-19 RX ADMIN — OXYCODONE AND ACETAMINOPHEN 1 TABLET(S): 5; 325 TABLET ORAL at 11:16

## 2017-09-19 RX ADMIN — Medication 100 MILLIGRAM(S): at 10:04

## 2017-09-19 RX ADMIN — Medication 400 MILLIGRAM(S): at 09:54

## 2017-09-19 RX ADMIN — Medication 200 MILLIGRAM(S): at 21:02

## 2017-09-19 RX ADMIN — VALSARTAN 160 MILLIGRAM(S): 80 TABLET ORAL at 18:26

## 2017-09-19 RX ADMIN — Medication 15 MILLIGRAM(S): at 09:55

## 2017-09-19 RX ADMIN — Medication 200 MILLIGRAM(S): at 23:00

## 2017-09-19 RX ADMIN — CARVEDILOL PHOSPHATE 3.12 MILLIGRAM(S): 80 CAPSULE, EXTENDED RELEASE ORAL at 18:26

## 2017-09-19 RX ADMIN — Medication 1000 MILLIGRAM(S): at 10:47

## 2017-09-19 RX ADMIN — OXYCODONE AND ACETAMINOPHEN 1 TABLET(S): 5; 325 TABLET ORAL at 11:06

## 2017-09-19 RX ADMIN — Medication 1 TABLET(S): at 20:59

## 2017-09-19 RX ADMIN — Medication 15 MILLIGRAM(S): at 09:38

## 2017-09-19 NOTE — ED CDU PROVIDER NOTE - PROGRESS NOTE DETAILS
CDU IAN QUINN: pt in bed, has no complaints at this time, getting abx for right hand cellulitis s/p iv placement, will continue abx and reasses IAN Rader: Pt symptoms improving. Pt feeling better. Spoke to Dr. Perdomo, pt stable for discharge on po clinda and to follow up in his office tomorrow. Pt agrees with plan. CDU Attending Dr. Ortez Discharge Note: pt signed out to me at 0700 today 9/20/17; 89 yo female with right hand cellulitis placed in CDU for IV abx and hand specialist follow up; on my evaluation pt notes improvement in hand pain; on my evaluation there is minimal erythema and swelling to dorsum of right hand, appears to be regressing from demarcation delineating cellulitis (placed yesterday); strength 5/5 in right hand; hand specialist Dr. Perdomo aware of pt and recommends discharge with PO abx and will see pt in his office tomorrow; pt stable for discharge with outpatient follow up DEJA MARROQUIN, MD: ACP note I performed the initial face to face bedside interview with this patient regarding history of present illness, review of symptoms and past medical, social and family history.  I completed an independent physical examination.  I was the initial provider who evaluated this patient.  The history, review of symptoms and examination was documented by myself and I attest to the accuracy of the documentation.  I have signed out the follow up of any pending tests (i.e. labs, radiological studies) to the PA.  I have discussed the patient’s plan of care and disposition with the PA.

## 2017-09-19 NOTE — ED PROVIDER NOTE - MEDICAL DECISION MAKING DETAILS
89 y/o F w/ likely cellulitis. Plan: IV fluids and antibiotics, labs, analgesia prn, XR right hand after pt has received analgesia and can complete exam.

## 2017-09-19 NOTE — ED PROVIDER NOTE - OBJECTIVE STATEMENT
89 y/o F w/ PMhx of HLD and HTN, recently hospitalized at [facility] for 3 days for consultation, presents to the ED c/o right hand redness, pain and swelling. Pt had an IV placed in right hand which was removed yesterday. Pt states there was no pain, redness or swelling at time IV was removed however subsequently last night developed pain, redness, swelling and decreased ROM of right wrist and hand starting at right thumb. No pain meds prior to arrival. Pt requesting pain meds at this time. Denies fever, chills, CP, SOB or any other complaints.

## 2017-09-19 NOTE — ED PROVIDER NOTE - UPPER EXTREMITY EXAM, RIGHT
RUE w/ tenderness, redness and swelling beginning at the distal radius ulna and extending to the carpal metacarpal joints, diffusely on the dorsal aspect w/ swelling on the volar aspect of the hand and significant pain on palpation or minimal range of motion

## 2017-09-19 NOTE — ED CDU PROVIDER NOTE - ATTENDING CONTRIBUTION TO CARE
DEJA MARROQUIN MD: 89 y/o F w/ PMhx of HLD and HTN, recently hospitalized at [facility] for 3 days for consultation, presents to the ED c/o right hand redness, pain and swelling. Pt had an IV placed in right hand which was removed yesterday. Pt states there was no pain, redness or swelling at time IV was removed however subsequently last night developed pain, redness, swelling and decreased ROM of right wrist and hand starting at right thumb. No pain meds prior to arrival. Pt requesting pain meds at this time. Denies fever, chills, CP, SOB or any other complaints.  CARA DIALLO NOTE:  Patient is awake and alert and in no acute distress.  Normocephalic/atraumatic.  Auricles are normal.  Neck supple.  Lungs CTAB, no wheeze, no rhonchi,  no rales.  Heart is regular rate and rhythm.  Abdomen is soft, not distended +BS.  Back is nontender, no CVAT.  Moving all 4 extremities, swelling to the hand with erythema, tenderness to palpation and decreased ROM with 2+ pulses.   Neurologically grossly intact.  Affect is appropriate.  DEJA MARROQUIN MD: ACP note I performed the initial face to face bedside interview with this patient regarding history of present illness, review of symptoms and past medical, social and family history.  I completed an independent physical examination.  I was the initial provider who evaluated this patient.  The history, review of symptoms and examination was documented by myself and I attest to the accuracy of the documentation.  I have signed out the follow up of any pending tests (i.e. labs, radiological studies) to the PA.  I have discussed the patient’s plan of care and disposition with the PA.

## 2017-09-19 NOTE — CONSULT NOTE ADULT - SUBJECTIVE AND OBJECTIVE BOX
89 year old female presented to the [facility] ED with right hand pain and erythema for 1 day. Patient was recently discharged from [facility]. However, later that night she began to develop right hand pain and erythema at the site of a previous IV. She then presented to [facility] for evaluation. No fevers/chills. No recent trauma.    PAST MEDICAL & SURGICAL HISTORY:  Hyperlipidemia  Hypertension, unspecified type  H/O:     Vital Signs Last 24 Hrs  T(C): 36.6 (19 Sep 2017 14:14), Max: 37 (19 Sep 2017 08:47)  T(F): 97.9 (19 Sep 2017 14:14), Max: 98.6 (19 Sep 2017 08:47)  HR: 68 (19 Sep 2017 14:14) (68 - 77)  BP: 109/46 (19 Sep 2017 14:14) (109/46 - 143/56)  BP(mean): --  RR: 18 (19 Sep 2017 14:14) (16 - 18)  SpO2: 98% (19 Sep 2017 14:14) (98% - 100%)    CBC Full  -  ( 19 Sep 2017 09:22 )  WBC Count : 13.27 K/uL  Hemoglobin : 11.7 g/dL  Hematocrit : 35.1 %  Platelet Count - Automated : 277 K/uL  Mean Cell Volume : 87.3 fL  Mean Cell Hemoglobin : 29.1 pg  Mean Cell Hemoglobin Concentration : 33.3 %  Auto Neutrophil # : 10.83 K/uL  Auto Lymphocyte # : 1.29 K/uL  Auto Monocyte # : 1.04 K/uL  Auto Eosinophil # : 0.02 K/uL  Auto Basophil # : 0.03 K/uL  Auto Neutrophil % : 81.6 %  Auto Lymphocyte % : 9.7 %  Auto Monocyte % : 7.8 %  Auto Eosinophil % : 0.2 %  Auto Basophil % : 0.2 %        135  |  99  |  13  ----------------------------<  116<H>  3.7   |  20<L>  |  0.65    Ca    7.8<L>      19 Sep 2017 09:22    TPro  6.1  /  Alb  3.5  /  TBili  0.3  /  DBili  x   /  AST  54<H>  /  ALT  36<H>  /  AlkPhos  46      XRay: no fractures/dislocations    Exam:  Gen: NAD, erythema over dorsal surface of right hand. No fluctuance.  Motor: 5/5 AIN/PIN/Median/Radial/Ulnar nerve distributions  Sensory: SILT Median/Radial/Ulnar Nerve Distributions    A/P: 89 year old female with right hand cellulitis  - Pain control  - Continue with antibiotics  - No acute orthopedic intervention  - Follow up with Dr. Perdomo. [phone]

## 2017-09-19 NOTE — ED ADULT TRIAGE NOTE - CHIEF COMPLAINT QUOTE
Pt was admitted to [facility] for three days pt states she had and iv in the right hand and this morning she developed severe pain to the right hand. Pt right hand is noted with swelling skin warm to touch. Radial pulse is present.

## 2017-09-19 NOTE — ED ADULT NURSE NOTE - CHIEF COMPLAINT QUOTE
Pt was admitted to [facility] for three days pt states she had and iv in the right hand and this morning she developed severe pain to the right hand. Pt right hand is noted with swelling skin warm to touch. Radial pulse is present.

## 2017-09-19 NOTE — ED CDU PROVIDER NOTE - OBJECTIVE STATEMENT
89 y/o F w/ PMhx of HLD and HTN, recently hospitalized at [facility] for 3 days for consultation, presents to the ED c/o right hand redness, pain and swelling. Pt had an IV placed in right hand which was removed yesterday. Pt states there was no pain, redness or swelling at time IV was removed however subsequently last night developed pain, redness, swelling and decreased ROM of right wrist and hand starting at right thumb. No pain meds prior to arrival. Pt requesting pain meds at this time. Denies fever, chills, CP, SOB or any other complaints.

## 2017-09-19 NOTE — ED ADULT NURSE NOTE - OBJECTIVE STATEMENT
Pt with right hand swelling and pain s/p IV use while in hospital. IVL placed. Bloods drawn. Will continue to monitor.

## 2017-09-19 NOTE — ED CDU PROVIDER NOTE - ENMT, MLM
Airway patent. Nasal mucosa clear. Mouth with normal mucosa. Throat has no vesicles, no oropharyngeal exudates and uvula is midline. Airway patent.  No stridor, no oropharyngeal edema.

## 2017-09-19 NOTE — ED PROVIDER NOTE - NS_ ATTENDINGSCRIBEDETAILS _ED_A_ED_FT
The scribe's documentation has been prepared under my direction and personally reviewed by me, Vanessa Chandra MD, in its entirety. I confirm that the note above accurately reflects all work, treatment, procedures, and medical decision making performed by me.

## 2017-09-20 ENCOUNTER — INPATIENT (INPATIENT)
Facility: HOSPITAL | Age: 82
LOS: 3 days | Discharge: HOME | End: 2017-09-24
Attending: INTERNAL MEDICINE | Admitting: INTERNAL MEDICINE
Payer: MEDICARE

## 2017-09-20 VITALS
OXYGEN SATURATION: 96 % | DIASTOLIC BLOOD PRESSURE: 56 MMHG | RESPIRATION RATE: 14 BRPM | TEMPERATURE: 98 F | SYSTOLIC BLOOD PRESSURE: 106 MMHG | HEART RATE: 66 BPM

## 2017-09-20 VITALS
TEMPERATURE: 98 F | HEART RATE: 71 BPM | RESPIRATION RATE: 17 BRPM | OXYGEN SATURATION: 95 % | SYSTOLIC BLOOD PRESSURE: 163 MMHG | DIASTOLIC BLOOD PRESSURE: 68 MMHG

## 2017-09-20 DIAGNOSIS — Z98.891 HISTORY OF UTERINE SCAR FROM PREVIOUS SURGERY: Chronic | ICD-10-CM

## 2017-09-20 RX ORDER — VANCOMYCIN HCL 1 G
1000 VIAL (EA) INTRAVENOUS ONCE
Qty: 0 | Refills: 0 | Status: COMPLETED | OUTPATIENT
Start: 2017-09-20 | End: 2017-09-21

## 2017-09-20 RX ADMIN — Medication 100 MILLIGRAM(S): at 10:50

## 2017-09-20 RX ADMIN — Medication 200 MILLIGRAM(S): at 10:59

## 2017-09-20 RX ADMIN — Medication 1 TABLET(S): at 10:59

## 2017-09-20 RX ADMIN — CARVEDILOL PHOSPHATE 3.12 MILLIGRAM(S): 80 CAPSULE, EXTENDED RELEASE ORAL at 07:02

## 2017-09-20 RX ADMIN — VALSARTAN 160 MILLIGRAM(S): 80 TABLET ORAL at 07:02

## 2017-09-20 RX ADMIN — Medication 100 MILLIGRAM(S): at 02:07

## 2017-09-20 RX ADMIN — Medication 200 MILLIGRAM(S): at 11:57

## 2017-09-20 NOTE — ED PROVIDER NOTE - ATTENDING CONTRIBUTION TO CARE
89 y/o female w/ worsening cellulitis to R hand- labs, Vanc, ortho/hand consult, will admit as pt was already observed in the CDU earlier today.  I performed a history and physical exam of the patient and discussed their management with the advanced care provider. I reviewed the advanced care provider's note and agree with the documented findings and plan of care. My medical decision making and objective findings are found above.

## 2017-09-20 NOTE — ED ADULT TRIAGE NOTE - CHIEF COMPLAINT QUOTE
Pt d/c from CDU today for phlebitis to rt hand, d/c on clindamycin 300mg 3x a day. Returned tonight for unresolved symptoms, increase redness and swelling denies fever/chills at home.

## 2017-09-20 NOTE — ED PROVIDER NOTE - PHYSICAL EXAMINATION
R hand- edema, swelling and erythema to hand w/ proximal streaking up forearm. Redness surpassing outline from yesterday. 2+ pulses, distal sensation intact, cap refill <2 sec, no area of induration or fluctuance.

## 2017-09-20 NOTE — ED PROVIDER NOTE - OBJECTIVE STATEMENT
89 y/o  female pmh htn, hld c/o worsening swelling and redness to R hand x today. Pt was dc from CDU earlier today after admission for cellulitis to R hand. Pt was treated with Clinda with good response but admits to worsening swelling and redness since being home. Pt admits to taking all 3 doses of PO clinda today. Admits to pain over proximal 1st digit. Denies chest pain, sob, abd pain, n/v/d, numbness, tingling, weakness, dizziness, syncope, fever or chills.

## 2017-09-21 ENCOUNTER — TRANSCRIPTION ENCOUNTER (OUTPATIENT)
Age: 82
End: 2017-09-21

## 2017-09-21 DIAGNOSIS — L03.113 CELLULITIS OF RIGHT UPPER LIMB: ICD-10-CM

## 2017-09-21 DIAGNOSIS — L03.119 CELLULITIS OF UNSPECIFIED PART OF LIMB: ICD-10-CM

## 2017-09-21 DIAGNOSIS — Z29.9 ENCOUNTER FOR PROPHYLACTIC MEASURES, UNSPECIFIED: ICD-10-CM

## 2017-09-21 DIAGNOSIS — R74.0 NONSPECIFIC ELEVATION OF LEVELS OF TRANSAMINASE AND LACTIC ACID DEHYDROGENASE [LDH]: ICD-10-CM

## 2017-09-21 DIAGNOSIS — E78.5 HYPERLIPIDEMIA, UNSPECIFIED: ICD-10-CM

## 2017-09-21 DIAGNOSIS — E87.1 HYPO-OSMOLALITY AND HYPONATREMIA: ICD-10-CM

## 2017-09-21 DIAGNOSIS — I10 ESSENTIAL (PRIMARY) HYPERTENSION: ICD-10-CM

## 2017-09-21 DIAGNOSIS — L03.90 CELLULITIS, UNSPECIFIED: ICD-10-CM

## 2017-09-21 LAB
ALBUMIN SERPL ELPH-MCNC: 3.5 G/DL — SIGNIFICANT CHANGE UP (ref 3.3–5)
ALBUMIN SERPL ELPH-MCNC: 3.6 G/DL — SIGNIFICANT CHANGE UP (ref 3.3–5)
ALP SERPL-CCNC: 80 U/L — SIGNIFICANT CHANGE UP (ref 40–120)
ALP SERPL-CCNC: 91 U/L — SIGNIFICANT CHANGE UP (ref 40–120)
ALT FLD-CCNC: 142 U/L — HIGH (ref 4–33)
ALT FLD-CCNC: 178 U/L — HIGH (ref 4–33)
AST SERPL-CCNC: 116 U/L — HIGH (ref 4–32)
AST SERPL-CCNC: 185 U/L — HIGH (ref 4–32)
BACTERIA BLD CULT: SIGNIFICANT CHANGE UP
BASOPHILS # BLD AUTO: 0.04 K/UL — SIGNIFICANT CHANGE UP (ref 0–0.2)
BASOPHILS NFR BLD AUTO: 0.4 % — SIGNIFICANT CHANGE UP (ref 0–2)
BILIRUB DIRECT SERPL-MCNC: 0.1 MG/DL — SIGNIFICANT CHANGE UP (ref 0.1–0.2)
BILIRUB SERPL-MCNC: 0.2 MG/DL — SIGNIFICANT CHANGE UP (ref 0.2–1.2)
BILIRUB SERPL-MCNC: 0.3 MG/DL — SIGNIFICANT CHANGE UP (ref 0.2–1.2)
BUN SERPL-MCNC: 17 MG/DL — SIGNIFICANT CHANGE UP (ref 7–23)
CALCIUM SERPL-MCNC: 7.7 MG/DL — LOW (ref 8.4–10.5)
CHLORIDE SERPL-SCNC: 96 MMOL/L — LOW (ref 98–107)
CO2 SERPL-SCNC: 19 MMOL/L — LOW (ref 22–31)
CREAT SERPL-MCNC: 0.83 MG/DL — SIGNIFICANT CHANGE UP (ref 0.5–1.3)
CRP SERPL-MCNC: 44.8 MG/L — SIGNIFICANT CHANGE UP
EOSINOPHIL # BLD AUTO: 0.19 K/UL — SIGNIFICANT CHANGE UP (ref 0–0.5)
EOSINOPHIL NFR BLD AUTO: 2.1 % — SIGNIFICANT CHANGE UP (ref 0–6)
ERYTHROCYTE [SEDIMENTATION RATE] IN BLOOD: 36 MM/HR — HIGH (ref 4–25)
GLUCOSE SERPL-MCNC: 105 MG/DL — HIGH (ref 70–99)
HCT VFR BLD CALC: 35.2 % — SIGNIFICANT CHANGE UP (ref 34.5–45)
HGB BLD-MCNC: 11.5 G/DL — SIGNIFICANT CHANGE UP (ref 11.5–15.5)
IMM GRANULOCYTES # BLD AUTO: 0.07 # — SIGNIFICANT CHANGE UP
IMM GRANULOCYTES NFR BLD AUTO: 0.8 % — SIGNIFICANT CHANGE UP (ref 0–1.5)
LYMPHOCYTES # BLD AUTO: 1.76 K/UL — SIGNIFICANT CHANGE UP (ref 1–3.3)
LYMPHOCYTES # BLD AUTO: 19.6 % — SIGNIFICANT CHANGE UP (ref 13–44)
MCHC RBC-ENTMCNC: 28.5 PG — SIGNIFICANT CHANGE UP (ref 27–34)
MCHC RBC-ENTMCNC: 32.7 % — SIGNIFICANT CHANGE UP (ref 32–36)
MCV RBC AUTO: 87.3 FL — SIGNIFICANT CHANGE UP (ref 80–100)
MONOCYTES # BLD AUTO: 1.04 K/UL — HIGH (ref 0–0.9)
MONOCYTES NFR BLD AUTO: 11.6 % — SIGNIFICANT CHANGE UP (ref 2–14)
NEUTROPHILS # BLD AUTO: 5.9 K/UL — SIGNIFICANT CHANGE UP (ref 1.8–7.4)
NEUTROPHILS NFR BLD AUTO: 65.5 % — SIGNIFICANT CHANGE UP (ref 43–77)
NRBC # FLD: 0 — SIGNIFICANT CHANGE UP
PLATELET # BLD AUTO: 311 K/UL — SIGNIFICANT CHANGE UP (ref 150–400)
PMV BLD: 9.9 FL — SIGNIFICANT CHANGE UP (ref 7–13)
POTASSIUM SERPL-MCNC: 4.2 MMOL/L — SIGNIFICANT CHANGE UP (ref 3.5–5.3)
POTASSIUM SERPL-SCNC: 4.2 MMOL/L — SIGNIFICANT CHANGE UP (ref 3.5–5.3)
PROT SERPL-MCNC: 6 G/DL — SIGNIFICANT CHANGE UP (ref 6–8.3)
PROT SERPL-MCNC: 6.6 G/DL — SIGNIFICANT CHANGE UP (ref 6–8.3)
RBC # BLD: 4.03 M/UL — SIGNIFICANT CHANGE UP (ref 3.8–5.2)
RBC # FLD: 14.7 % — HIGH (ref 10.3–14.5)
SODIUM SERPL-SCNC: 130 MMOL/L — LOW (ref 135–145)
WBC # BLD: 9 K/UL — SIGNIFICANT CHANGE UP (ref 3.8–10.5)
WBC # FLD AUTO: 9 K/UL — SIGNIFICANT CHANGE UP (ref 3.8–10.5)

## 2017-09-21 RX ORDER — CALCIUM CARBONATE 500(1250)
2 TABLET ORAL
Qty: 0 | Refills: 0 | COMMUNITY

## 2017-09-21 RX ORDER — VALSARTAN 80 MG/1
160 TABLET ORAL DAILY
Qty: 0 | Refills: 0 | Status: DISCONTINUED | OUTPATIENT
Start: 2017-09-21 | End: 2017-09-24

## 2017-09-21 RX ORDER — CARVEDILOL PHOSPHATE 80 MG/1
1 CAPSULE, EXTENDED RELEASE ORAL
Qty: 0 | Refills: 0 | COMMUNITY

## 2017-09-21 RX ORDER — INFLUENZA VIRUS VACCINE 15; 15; 15; 15 UG/.5ML; UG/.5ML; UG/.5ML; UG/.5ML
0.5 SUSPENSION INTRAMUSCULAR ONCE
Qty: 0 | Refills: 0 | Status: DISCONTINUED | OUTPATIENT
Start: 2017-09-21 | End: 2017-09-24

## 2017-09-21 RX ORDER — SIMVASTATIN 20 MG/1
10 TABLET, FILM COATED ORAL AT BEDTIME
Qty: 0 | Refills: 0 | Status: DISCONTINUED | OUTPATIENT
Start: 2017-09-21 | End: 2017-09-21

## 2017-09-21 RX ORDER — VALSARTAN 80 MG/1
1 TABLET ORAL
Qty: 0 | Refills: 0 | COMMUNITY

## 2017-09-21 RX ORDER — ENOXAPARIN SODIUM 100 MG/ML
40 INJECTION SUBCUTANEOUS EVERY 24 HOURS
Qty: 0 | Refills: 0 | Status: DISCONTINUED | OUTPATIENT
Start: 2017-09-21 | End: 2017-09-24

## 2017-09-21 RX ORDER — ASPIRIN/CALCIUM CARB/MAGNESIUM 324 MG
81 TABLET ORAL DAILY
Qty: 0 | Refills: 0 | Status: DISCONTINUED | OUTPATIENT
Start: 2017-09-21 | End: 2017-09-24

## 2017-09-21 RX ORDER — ASPIRIN/CALCIUM CARB/MAGNESIUM 324 MG
1 TABLET ORAL
Qty: 0 | Refills: 0 | COMMUNITY

## 2017-09-21 RX ORDER — VANCOMYCIN HCL 1 G
1000 VIAL (EA) INTRAVENOUS EVERY 12 HOURS
Qty: 0 | Refills: 0 | Status: DISCONTINUED | OUTPATIENT
Start: 2017-09-21 | End: 2017-09-24

## 2017-09-21 RX ORDER — SODIUM CHLORIDE 9 MG/ML
1000 INJECTION INTRAMUSCULAR; INTRAVENOUS; SUBCUTANEOUS
Qty: 0 | Refills: 0 | Status: DISCONTINUED | OUTPATIENT
Start: 2017-09-21 | End: 2017-09-21

## 2017-09-21 RX ORDER — MULTIVIT-MIN/FERROUS GLUCONATE 9 MG/15 ML
1 LIQUID (ML) ORAL
Qty: 0 | Refills: 0 | COMMUNITY

## 2017-09-21 RX ORDER — SODIUM CHLORIDE 9 MG/ML
1000 INJECTION INTRAMUSCULAR; INTRAVENOUS; SUBCUTANEOUS
Qty: 0 | Refills: 0 | Status: DISCONTINUED | OUTPATIENT
Start: 2017-09-21 | End: 2017-09-23

## 2017-09-21 RX ORDER — CHOLECALCIFEROL (VITAMIN D3) 125 MCG
3 CAPSULE ORAL
Qty: 0 | Refills: 0 | COMMUNITY

## 2017-09-21 RX ORDER — PREGABALIN 225 MG/1
1 CAPSULE ORAL
Qty: 0 | Refills: 0 | COMMUNITY

## 2017-09-21 RX ORDER — DOCUSATE SODIUM 100 MG
100 CAPSULE ORAL DAILY
Qty: 0 | Refills: 0 | Status: DISCONTINUED | OUTPATIENT
Start: 2017-09-21 | End: 2017-09-24

## 2017-09-21 RX ORDER — CARVEDILOL PHOSPHATE 80 MG/1
3.12 CAPSULE, EXTENDED RELEASE ORAL EVERY 12 HOURS
Qty: 0 | Refills: 0 | Status: DISCONTINUED | OUTPATIENT
Start: 2017-09-21 | End: 2017-09-24

## 2017-09-21 RX ORDER — IBUPROFEN 200 MG
200 TABLET ORAL EVERY 8 HOURS
Qty: 0 | Refills: 0 | Status: DISCONTINUED | OUTPATIENT
Start: 2017-09-21 | End: 2017-09-24

## 2017-09-21 RX ORDER — LACTOBACILLUS ACIDOPHILUS 100MM CELL
1 CAPSULE ORAL DAILY
Qty: 0 | Refills: 0 | Status: DISCONTINUED | OUTPATIENT
Start: 2017-09-21 | End: 2017-09-24

## 2017-09-21 RX ADMIN — ENOXAPARIN SODIUM 40 MILLIGRAM(S): 100 INJECTION SUBCUTANEOUS at 06:22

## 2017-09-21 RX ADMIN — Medication 250 MILLIGRAM(S): at 00:16

## 2017-09-21 RX ADMIN — CARVEDILOL PHOSPHATE 3.12 MILLIGRAM(S): 80 CAPSULE, EXTENDED RELEASE ORAL at 06:22

## 2017-09-21 RX ADMIN — Medication 250 MILLIGRAM(S): at 13:57

## 2017-09-21 RX ADMIN — Medication 81 MILLIGRAM(S): at 13:57

## 2017-09-21 RX ADMIN — SODIUM CHLORIDE 75 MILLILITER(S): 9 INJECTION INTRAMUSCULAR; INTRAVENOUS; SUBCUTANEOUS at 13:58

## 2017-09-21 RX ADMIN — SODIUM CHLORIDE 75 MILLILITER(S): 9 INJECTION INTRAMUSCULAR; INTRAVENOUS; SUBCUTANEOUS at 03:43

## 2017-09-21 RX ADMIN — Medication 200 MILLIGRAM(S): at 16:30

## 2017-09-21 RX ADMIN — SODIUM CHLORIDE 75 MILLILITER(S): 9 INJECTION INTRAMUSCULAR; INTRAVENOUS; SUBCUTANEOUS at 21:04

## 2017-09-21 RX ADMIN — Medication 200 MILLIGRAM(S): at 15:39

## 2017-09-21 RX ADMIN — CARVEDILOL PHOSPHATE 3.12 MILLIGRAM(S): 80 CAPSULE, EXTENDED RELEASE ORAL at 17:19

## 2017-09-21 RX ADMIN — VALSARTAN 160 MILLIGRAM(S): 80 TABLET ORAL at 06:22

## 2017-09-21 RX ADMIN — Medication 1 TABLET(S): at 13:56

## 2017-09-21 RX ADMIN — Medication 100 MILLIGRAM(S): at 13:58

## 2017-09-21 NOTE — DISCHARGE NOTE ADULT - CARE PLAN
Principal Discharge DX:	Cellulitis of right upper extremity  Goal:	Resolution of infection  Instructions for follow-up, activity and diet:	You presented with right arm cellulitis. Treated with IV antibiotics with improvement. Follow up PCP regarding further management.  Secondary Diagnosis:	Essential hypertension  Instructions for follow-up, activity and diet:	Continue home medications. Follow up PCP regarding further management.  Secondary Diagnosis:	Transaminitis  Instructions for follow-up, activity and diet:	You presented with elevated liver function tests. Ultrasound of the abdomen showed indeterminate lesion in right hepatic lobe seen on recent CT. Follow up with repeat US/CT scan in 3-6 months. Follow up PCP regarding further management. Principal Discharge DX:	Cellulitis of right upper extremity  Goal:	Resolution of infection  Instructions for follow-up, activity and diet:	You presented with right arm cellulitis. Treated with IV antibiotics with improvement. Continue with antibiotics as prescribed. Follow up PCP regarding further management. Monitor for fevers, chills, hand swelling, redness.  Secondary Diagnosis:	Essential hypertension  Instructions for follow-up, activity and diet:	Continue home medications. You presented with elevated blood pressure in the AM. This is most likely stress-induced. Follow up PCP regarding further management. Monitor blood pressure routinely and check for symptoms like chest pain, shortness of breath, palpitations.  Secondary Diagnosis:	Transaminitis  Goal:	Mild, asymptomatic - follow up repeat blood  Instructions for follow-up, activity and diet:	You presented with elevated liver function tests. Ultrasound of the abdomen showed indeterminate lesion in right hepatic lobe seen on recent CT. Follow up PCP regarding further management. Principal Discharge DX:	Cellulitis of right upper extremity  Goal:	Resolution of infection  Instructions for follow-up, activity and diet:	You presented with right arm cellulitis. Treated with IV antibiotics with improvement. Continue with antibiotics as prescribed. Follow up PCP regarding further management. Monitor for fevers, chills, hand swelling, redness.  Secondary Diagnosis:	Essential hypertension  Instructions for follow-up, activity and diet:	Continue home medications. You presented with elevated blood pressure in the AM. This is most likely stress-induced. Follow up PCP regarding further management. Monitor blood pressure routinely and check for symptoms like chest pain, shortness of breath, palpitations.  Secondary Diagnosis:	Transaminitis  Goal:	Mild, asymptomatic - follow up repeat blood  Instructions for follow-up, activity and diet:	You presented with elevated liver function tests. Ultrasound of the abdomen was unremarkable. Follow up PCP regarding further management. Follow Principal Discharge DX:	Cellulitis of right upper extremity  Goal:	Resolution of infection  Instructions for follow-up, activity and diet:	You presented with right arm cellulitis. Treated with IV antibiotics with improvement. Continue with antibiotics as prescribed. Monitor for fevers, chills, hand swelling, redness. Follow up PCP within a week for further management. You were seen by Orthopedics, no acute intervention - follow up outpatient with Dr. Perdomo [phone].  Secondary Diagnosis:	Essential hypertension  Instructions for follow-up, activity and diet:	Continue home medications. You presented with elevated blood pressure in the AM. This may be stress-induced. Follow up PCP regarding further management. Monitor blood pressure routinely and check for symptoms like chest pain, shortness of breath, palpitations.  Secondary Diagnosis:	Transaminitis  Instructions for follow-up, activity and diet:	You presented with elevated liver function tests. Ultrasound of the abdomen was unremarkable. Follow up PCP within a week regarding further management. Follow up with repeat bloodwork (CBC/CMP) to re-check liver function.  Secondary Diagnosis:	Hyperlipidemia  Instructions for follow-up, activity and diet:	Your statin was held secondary to elevated liver function tests. Follow up with repeat bloodwork (CBC/CMP) to re-check liver function with PCP and to restart as per recommendations. Principal Discharge DX:	Cellulitis of right upper extremity  Goal:	Resolution of infection  Instructions for follow-up, activity and diet:	You presented with right arm cellulitis. Treated with IV antibiotics with improvement. Continue with antibiotics as prescribed. Monitor for fevers, chills, hand swelling, redness. Follow up PCP within a week for further management. You were seen by Orthopedics, no acute intervention - follow up outpatient with Dr. Perdomo [phone].  Secondary Diagnosis:	Essential hypertension  Goal:	Stable blood pressure  Instructions for follow-up, activity and diet:	Continue home medications. You presented with elevated blood pressure in the AM. This may be stress-induced. Stable. Follow up PCP within a week regarding further management. Monitor blood pressure routinely and check for symptoms like chest pain, shortness of breath, palpitations.  Secondary Diagnosis:	Transaminitis  Goal:	Mildly elevated, follow up PCP with repeat bloodwork  Instructions for follow-up, activity and diet:	You presented with elevated liver function tests. Ultrasound of the abdomen was unremarkable. Follow up PCP within a week regarding further management. Follow up with repeat bloodwork (CBC/CMP) to re-check liver function.  Secondary Diagnosis:	Hyperlipidemia  Instructions for follow-up, activity and diet:	Your statin was held secondary to elevated liver function tests. Follow up with repeat bloodwork (CBC/CMP) to re-check liver function with PCP within a week and to restart as per recommendations.

## 2017-09-21 NOTE — DISCHARGE NOTE ADULT - HOSPITAL COURSE
89 F with history of HTN and HLD who presents to the hospital with complaints of R hand swelling, pain, and redness concerning for R hand cellulitis.    Cellulitis of right upper extremity.   - Multiple recent admissions for R hand swelling, pain, redness, was suppose to follow up Dr. Perdomo (hand surgeon), hasn't scheduled an appointment   - Vancomycin IV -------> As per Dr. Butcher (ID) ---------- change to PO Doxy/Amoxicillin x 7 days   - Hand elevation  - As per Ortho, no acute orthopedic intervention - F/U outpatient with Dr. Perdomo [phone]    Transaminitis.    - Unclear source for patient's worsening transaminitis  - S/P IVF  - Abdominal US - No abnormality. The indeterminate lesion in right hepatic lobe seen on recent CT is too small to resolve on this exam.  - Improved, mild, asymptomatic   - Hold statin    Hyponatremia.   - Possibly due to poor PO intake from her frequent hospital visits over the past few days  - S/P IVF   - Stable, F/U outpatient PCP     Essential hypertension.  - Continue home meds   - Episode of HTN urgency in AM - resolved, likely stress-induced   - Stable, F/U outpatient PCP     Hyperlipidemia  - Continue home meds.   - Stable, F/U outpatient PCP     Discharge to home 89 F with history of HTN and HLD who presents to the hospital with complaints of R hand swelling, pain, and redness concerning for R hand cellulitis.    Cellulitis of right upper extremity.   - Multiple recent admissions for R hand swelling, pain, redness.  - Vancomycin IV -------> As per Dr. Butcher (ID) ---------- change to PO Doxy/Amoxicillin x 7 days   - Hand elevation  - As per Ortho, no acute orthopedic intervention - F/U outpatient with Dr. Perdomo [phone]    Transaminitis.    - Unclear source for patient's worsening transaminitis  - S/P IVF  - Abdominal US - No abnormality. The indeterminate lesion in right hepatic lobe seen on recent CT is too small to resolve on this exam.  - Improved, mild, asymptomatic   - Hold statin  - Stable, F/U outpatient PCP     Hyponatremia.   - Possibly due to poor PO intake from her frequent hospital visits over the past few days  - S/P IVF   - Stable, F/U outpatient PCP     Essential hypertension.  - Continue home meds   - Episode of HTN urgency in AM - resolved, likely stress-induced   - Stable, F/U outpatient PCP     Hyperlipidemia  - Continue home meds.   - Stable, F/U outpatient PCP     Discharge to home

## 2017-09-21 NOTE — DISCHARGE NOTE ADULT - PLAN OF CARE
Resolution of infection You presented with right arm cellulitis. Treated with IV antibiotics with improvement. Follow up PCP regarding further management. Continue home medications. Follow up PCP regarding further management. You presented with elevated liver function tests. Ultrasound of the abdomen showed indeterminate lesion in right hepatic lobe seen on recent CT. Follow up with repeat US/CT scan in 3-6 months. Follow up PCP regarding further management. Continue home medications. You presented with elevated blood pressure in the AM. This is most likely stress-induced. Follow up PCP regarding further management. Monitor blood pressure routinely and check for symptoms like chest pain, shortness of breath, palpitations. Mild, asymptomatic - follow up repeat blood You presented with elevated liver function tests. Ultrasound of the abdomen showed indeterminate lesion in right hepatic lobe seen on recent CT. Follow up PCP regarding further management. You presented with right arm cellulitis. Treated with IV antibiotics with improvement. Continue with antibiotics as prescribed. Follow up PCP regarding further management. Monitor for fevers, chills, hand swelling, redness. You presented with elevated liver function tests. Ultrasound of the abdomen was unremarkable. Follow up PCP regarding further management. Follow You presented with right arm cellulitis. Treated with IV antibiotics with improvement. Continue with antibiotics as prescribed. Monitor for fevers, chills, hand swelling, redness. Follow up PCP within a week for further management. You were seen by Orthopedics, no acute intervention - follow up outpatient with Dr. Perdomo [phone]. Continue home medications. You presented with elevated blood pressure in the AM. This may be stress-induced. Follow up PCP regarding further management. Monitor blood pressure routinely and check for symptoms like chest pain, shortness of breath, palpitations. You presented with elevated liver function tests. Ultrasound of the abdomen was unremarkable. Follow up PCP within a week regarding further management. Follow up with repeat bloodwork (CBC/CMP) to re-check liver function. Your statin was held secondary to elevated liver function tests. Follow up with repeat bloodwork (CBC/CMP) to re-check liver function with PCP and to restart as per recommendations. Stable blood pressure Continue home medications. You presented with elevated blood pressure in the AM. This may be stress-induced. Stable. Follow up PCP within a week regarding further management. Monitor blood pressure routinely and check for symptoms like chest pain, shortness of breath, palpitations. Mildly elevated, follow up PCP with repeat bloodwork Your statin was held secondary to elevated liver function tests. Follow up with repeat bloodwork (CBC/CMP) to re-check liver function with PCP within a week and to restart as per recommendations.

## 2017-09-21 NOTE — H&P ADULT - PROBLEM SELECTOR PLAN 1
- Patient with evidence of R hand cellulitis, was improving with IV clindamycin but worsened with PO clindamycin  - Now on vancomycin, would c/w for now  - Hand elevation  - Patient was supposed to see Hand Surgery ( Dr. Perdomo) as an outpatient but has not been able to schedule an appointment yet, consider hand surgery eval while patient is inpt  - monitor for improvement

## 2017-09-21 NOTE — H&P ADULT - NSHPPHYSICALEXAM_GEN_ALL_CORE
Vital Signs Last 24 Hrs  T(C): 36.4 (21 Sep 2017 03:23), Max: 36.9 (21 Sep 2017 02:13)  T(F): 97.5 (21 Sep 2017 03:23), Max: 98.4 (21 Sep 2017 02:13)  HR: 66 (21 Sep 2017 03:23) (66 - 71)  BP: 157/68 (21 Sep 2017 03:23) (106/56 - 163/68)  BP(mean): --  RR: 18 (21 Sep 2017 03:23) (14 - 18)  SpO2: 100% (21 Sep 2017 03:23) (95% - 100%)    GENERAL: No acute distress, well-developed  HEAD:  Atraumatic, Normocephalic  ENT: EOMI, PERRLA, conjunctiva and sclera clear, Neck supple, No JVD, moist mucosa  CHEST/LUNG: Clear to auscultation bilaterally; No wheeze, equal breath sounds bilaterally   BACK: No spinal tenderness  HEART: Regular rate and rhythm; No murmurs, rubs, or gallops  ABDOMEN: Soft, Nontender, Nondistended; Bowel sounds present  EXTREMITIES:  R hand swollen, some tenderness on palpation, soft, erythema noted from MCP joints to below wrist, not warm to touch  PSYCH: Nl behavior, nl affect  NEUROLOGY: AAOx3, non-focal, cranial nerves intact  SKIN: Erythema of R hand

## 2017-09-21 NOTE — H&P ADULT - PROBLEM SELECTOR PLAN 5
- Lovenox 40mg sq for DVT ppx - holding home meds for now due to transaminitis, restart if improving

## 2017-09-21 NOTE — CONSULT NOTE ADULT - SUBJECTIVE AND OBJECTIVE BOX
89 year old female presented to the [facility] ED with right hand pain and erythema for 1 day on . she had developed right hand pain and erythema at the site of a previous IV. she returned to the ED tonight because of increased swelling and pain. denied No fevers/chills. No new trauma.    PAST MEDICAL & SURGICAL HISTORY:  Hyperlipidemia  Hypertension, unspecified type  H/O:     ICU Vital Signs Last 24 Hrs  T(C): 36.4 (21 Sep 2017 03:23), Max: 36.9 (21 Sep 2017 02:13)  T(F): 97.5 (21 Sep 2017 03:23), Max: 98.4 (21 Sep 2017 02:13)  HR: 66 (21 Sep 2017 03:) (66 - 71)  BP: 157/68 (21 Sep 2017 03:) (106/56 - 163/68)  BP(mean): --  ABP: --  ABP(mean): --  RR: 18 (21 Sep 2017 03:23) (14 - 18)  SpO2: 100% (21 Sep 2017 03:23) (95% - 100%)                            11.5   9.00  )-----------( 311      ( 20 Sep 2017 23:50 )             35.2   09-20    130<L>  |  96<L>  |  17  ----------------------------<  105<H>  4.2   |  19<L>  |  0.83    Ca    7.7<L>      20 Sep 2017 23:50    TPro  6.6  /  Alb  3.6  /  TBili  0.2  /  DBili  x   /  AST  185<H>  /  ALT  178<H>  /  AlkPhos  91        Exam:  Gen: NAD, erythema over dorsal surface of right hand. No fluctuance.  Motor: 5/5 AIN/PIN/Median/Radial/Ulnar nerve distributions  Sensory: SILT Median/Radial/Ulnar Nerve Distributions    A/P: 89 year old female with right hand cellulitis, returned for increased erythema and swelling  - Pain control  - admit to medicine for IV abx  - No acute orthopedic intervention  - Follow up with Dr. Perdomo. [phone]

## 2017-09-21 NOTE — H&P ADULT - NSHPLABSRESULTS_GEN_ALL_CORE
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

## 2017-09-21 NOTE — DISCHARGE NOTE ADULT - CARE PROVIDER_API CALL
Silverio Brown  [facility]  Phone: [phone]  Fax: (   )    -    Silverio Perdomo), Orthopaedic Surgery; Surgery of the Hand  [address] [address]  Phone: [phone]  Fax: [phone]

## 2017-09-21 NOTE — ED ADULT NURSE NOTE - OBJECTIVE STATEMENT
Pt received into intake room 9 c/o worsening pain, swelling and redness to R hand today. Pt was dc from CDU earlier today after admission for cellulitis to R hand. Pt was D/C with PO clinda. Pt admits to taking all 3 doses of PO clinda today. Admits to pain over proximal 1st digit. Denies chest pain, sob, abd pain, n/v/d, numbness, tingling, weakness, dizziness, syncope, fever or chills. Md  evaluated, 22 G Iv placed to L hand, medicated as ordered. Will continue to monitor closely.

## 2017-09-21 NOTE — DISCHARGE NOTE ADULT - MEDICATION SUMMARY - MEDICATIONS TO STOP TAKING
I will STOP taking the medications listed below when I get home from the hospital:    clindamycin 300 mg oral capsule  -- 1 cap(s) by mouth 3 times a day   -- Finish all this medication unless otherwise directed by prescriber.  Medication should be taken with plenty of water. I will STOP taking the medications listed below when I get home from the hospital:    simvastatin 10 mg oral tablet  -- 1 tab(s) by mouth once a day (at bedtime)    clindamycin 300 mg oral capsule  -- 1 cap(s) by mouth 3 times a day   -- Finish all this medication unless otherwise directed by prescriber.  Medication should be taken with plenty of water.

## 2017-09-21 NOTE — H&P ADULT - PROBLEM SELECTOR PLAN 3
- c/w home meds - Possibly due to poor PO intake from her frequent hospital visits over the past few days, would hydrate and monitor in AM

## 2017-09-21 NOTE — H&P ADULT - HISTORY OF PRESENT ILLNESS
This is a 89F with history of HTN and HLD who presents to the hospital with complaints of R hand swelling, pain, and redness for the past 3 days (since 9/18). Patient was recently hospitalized at [facility] from 9/15 to 9/18 for constipation, was discharged in the afternoon of 9/18 and said that by that night she had noted severe R hand swelling, pain, warmth and redness. She presented to the hospital on 9/19 for evaluation and had a CDU stay where she was given IV clindamycin with significant improvement in her symptoms and was discharged with oral clindamycin. Said that after the second discharge her hand started to swell again and she presented back to the hospital on 9/20 for evaluation. Patient states that she did have an IV placed at the dorsal aspect of her R hand during her initial hospitalization but had not noted any pain, swelling, redness, or pustular drainage associated with it, said that she was having some R thumb pain on 9/18 prior to her discharge but was not associated with any other symptoms and thought the pain might have been related to her underlying arthritis. Currently her R hand is still swollen, painful and red. She denies any fevers, chills, or diaphoresis, and denies any other complaints.    In the ED, her vitals were T 98.1, P 71, /68, R 17, O2 sat 95% RA. Her current lab work shows some worsening transaminitis and hyponatremia but nl WBC (previous lab work from 9/19 showed leukocytosis with neutrophilia). She was given vancomycin 1g IVPB and was admitted to medicine.

## 2017-09-21 NOTE — DISCHARGE NOTE ADULT - MEDICATION SUMMARY - MEDICATIONS TO TAKE
I will START or STAY ON the medications listed below when I get home from the hospital:    Aspirin Enteric Coated 81 mg oral delayed release tablet  -- 1 tab(s) by mouth once a day  -- Indication: For CAD prophylaxis    ibuprofen 200 mg oral tablet  -- 1 tab(s) by mouth every 8 hours, As needed, Mild to severe pain  -- Indication: For Mild to severe pain    valsartan 160 mg oral tablet  -- 1 tab(s) by mouth once a day  -- Indication: For Hypertension    Caltrate 600 mg oral tablet  -- 2 tab(s) by mouth once a day  -- Indication: For Supplement     doxycycline hyclate 100 mg oral tablet  -- 1 tab(s) by mouth 2 times a day  -- Indication: For Cellulitis    carvedilol 3.125 mg oral tablet  -- 1 tab(s) by mouth 2 times a day  -- Indication: For Hypertension    amoxicillin 500 mg oral tablet  -- 1 tab(s) by mouth 2 times a day  -- Indication: For Cellulitis    PreserVision oral tablet  -- 1 tab(s) by mouth once a day  -- Indication: For Supplement    Vitamin D3 1000 intl units oral capsule  -- 3 cap(s) by mouth once a day  -- Indication: For Supplement    Vitamin B12 250 mcg oral tablet  -- 1 tab(s) by mouth once a day  -- Indication: For Supplement I will START or STAY ON the medications listed below when I get home from the hospital:    Aspirin Enteric Coated 81 mg oral delayed release tablet  -- 1 tab(s) by mouth once a day  -- Indication: For CAD prophylaxis    ibuprofen 200 mg oral tablet  -- 1 tab(s) by mouth every 8 hours, As needed, Mild to severe pain  -- Indication: For Mild to severe pain    valsartan 160 mg oral tablet  -- 1 tab(s) by mouth once a day  -- Indication: For Hypertension    Caltrate 600 mg oral tablet  -- 2 tab(s) by mouth once a day  -- Indication: For Supplement     doxycycline hyclate 100 mg oral tablet  -- 1 tab(s) by mouth 2 times a day  -- Indication: For Cellulitis    carvedilol 3.125 mg oral tablet  -- 1 tab(s) by mouth 2 times a day  -- Indication: For Hypertension    Colace 100 mg oral capsule  -- 1 cap(s) by mouth once a day  -- Indication: For Constipation    MiraLax oral powder for reconstitution  -- 17 gram(s) by mouth once a day  -- Indication: For Constipation    amoxicillin 500 mg oral tablet  -- 1 tab(s) by mouth 2 times a day  -- Indication: For Cellulitis    PreserVision oral tablet  -- 1 tab(s) by mouth once a day  -- Indication: For Supplement    Vitamin D3 1000 intl units oral capsule  -- 3 cap(s) by mouth once a day  -- Indication: For Supplement    Vitamin B12 250 mcg oral tablet  -- 1 tab(s) by mouth once a day  -- Indication: For Supplement

## 2017-09-21 NOTE — H&P ADULT - PROBLEM SELECTOR PLAN 2
- Unclear source for patient's worsening transaminitis, would c/w IVF and monitor in AM  - If worsening then would consider further w/u with imaging and lab studies

## 2017-09-21 NOTE — H&P ADULT - ASSESSMENT
This is a 89F with history of HTN and HLD who presents to the hospital with complaints of R hand swelling, pain, and redness concerning for R hand cellulitis.

## 2017-09-21 NOTE — DISCHARGE NOTE ADULT - PROVIDER TOKENS
FREE:[LAST:[Stephanie],FIRST:[Silverio Solitario],PHONE:[phone]],FAX:[(   [phone]    -],ADDRESS:[[facility]]],TOKEN:'[id number]'

## 2017-09-21 NOTE — ED ADULT NURSE NOTE - ED STAT RN HANDOFF DETAILS
Patient is in NAD, and has room available.  Report given to nurse on floor via phone.  Patient awaiting transportation.  Will continue to monitor patient closely. DOMINICK Gutierrez R.N.

## 2017-09-21 NOTE — H&P ADULT - NSHPSOCIALHISTORY_GEN_ALL_CORE
Social History:    Marital Status:  (   )    ( X ) Single    (   )    (  )   Occupation: Retired  Lives with: ( X ) alone  (  ) children   (  ) spouse   (  ) parents  (  ) other    Substance Use (street drugs): ( X ) never used  (  ) other:  Tobacco Usage:  ( X ) never smoked   (   ) former smoker   (   ) current smoker  (     ) pack year  (        ) last cigarette date  Alcohol Usage: Occasional use

## 2017-09-21 NOTE — H&P ADULT - NSHPREVIEWOFSYSTEMS_GEN_ALL_CORE
REVIEW OF SYSTEMS:    CONSTITUTIONAL: No weakness, fevers or chills  EYES/ENT: No visual changes;  No dysphagia  NECK: No pain or stiffness  RESPIRATORY: No cough, wheezing, hemoptysis; No shortness of breath  CARDIOVASCULAR: No chest pain or palpitations; No lower extremity edema  GASTROINTESTINAL: No abdominal or epigastric pain. No nausea, vomiting, or hematemesis; No diarrhea or constipation. No melena or hematochezia.  BACK: No back pain  MSK: R hand swelling and pain  GENITOURINARY: No dysuria, frequency or hematuria  NEUROLOGICAL: No numbness or weakness  SKIN: Redness of R hand from fingers down to wrist  All other review of systems is negative unless indicated above.

## 2017-09-21 NOTE — DISCHARGE NOTE ADULT - PATIENT PORTAL LINK FT
“You can access the [facility] Patient Portal, offered by [facility], by registering with the following website: [email or web address]”

## 2017-09-22 LAB
BUN SERPL-MCNC: 9 MG/DL — SIGNIFICANT CHANGE UP (ref 7–23)
CALCIUM SERPL-MCNC: 7.5 MG/DL — LOW (ref 8.4–10.5)
CHLORIDE SERPL-SCNC: 106 MMOL/L — SIGNIFICANT CHANGE UP (ref 98–107)
CO2 SERPL-SCNC: 20 MMOL/L — LOW (ref 22–31)
CREAT SERPL-MCNC: 0.63 MG/DL — SIGNIFICANT CHANGE UP (ref 0.5–1.3)
GLUCOSE SERPL-MCNC: 93 MG/DL — SIGNIFICANT CHANGE UP (ref 70–99)
HCT VFR BLD CALC: 29.5 % — LOW (ref 34.5–45)
HCT VFR BLD CALC: 32.6 % — LOW (ref 34.5–45)
HGB BLD-MCNC: 11 G/DL — LOW (ref 11.5–15.5)
HGB BLD-MCNC: 9.9 G/DL — LOW (ref 11.5–15.5)
MCHC RBC-ENTMCNC: 28.9 PG — SIGNIFICANT CHANGE UP (ref 27–34)
MCHC RBC-ENTMCNC: 29.9 PG — SIGNIFICANT CHANGE UP (ref 27–34)
MCHC RBC-ENTMCNC: 33.6 % — SIGNIFICANT CHANGE UP (ref 32–36)
MCHC RBC-ENTMCNC: 33.7 % — SIGNIFICANT CHANGE UP (ref 32–36)
MCV RBC AUTO: 86 FL — SIGNIFICANT CHANGE UP (ref 80–100)
MCV RBC AUTO: 88.6 FL — SIGNIFICANT CHANGE UP (ref 80–100)
NRBC # FLD: 0 — SIGNIFICANT CHANGE UP
NRBC # FLD: 0 — SIGNIFICANT CHANGE UP
PLATELET # BLD AUTO: 299 K/UL — SIGNIFICANT CHANGE UP (ref 150–400)
PLATELET # BLD AUTO: 303 K/UL — SIGNIFICANT CHANGE UP (ref 150–400)
PMV BLD: 9.5 FL — SIGNIFICANT CHANGE UP (ref 7–13)
PMV BLD: 9.8 FL — SIGNIFICANT CHANGE UP (ref 7–13)
POTASSIUM SERPL-MCNC: 3.9 MMOL/L — SIGNIFICANT CHANGE UP (ref 3.5–5.3)
POTASSIUM SERPL-SCNC: 3.9 MMOL/L — SIGNIFICANT CHANGE UP (ref 3.5–5.3)
RBC # BLD: 3.43 M/UL — LOW (ref 3.8–5.2)
RBC # BLD: 3.68 M/UL — LOW (ref 3.8–5.2)
RBC # FLD: 14.9 % — HIGH (ref 10.3–14.5)
RBC # FLD: 14.9 % — HIGH (ref 10.3–14.5)
SODIUM SERPL-SCNC: 139 MMOL/L — SIGNIFICANT CHANGE UP (ref 135–145)
VANCOMYCIN TROUGH SERPL-MCNC: 12.5 UG/ML — SIGNIFICANT CHANGE UP (ref 10–20)
WBC # BLD: 6.62 K/UL — SIGNIFICANT CHANGE UP (ref 3.8–10.5)
WBC # BLD: 8.43 K/UL — SIGNIFICANT CHANGE UP (ref 3.8–10.5)
WBC # FLD AUTO: 6.62 K/UL — SIGNIFICANT CHANGE UP (ref 3.8–10.5)
WBC # FLD AUTO: 8.43 K/UL — SIGNIFICANT CHANGE UP (ref 3.8–10.5)

## 2017-09-22 RX ORDER — SENNA PLUS 8.6 MG/1
1 TABLET ORAL AT BEDTIME
Qty: 0 | Refills: 0 | Status: DISCONTINUED | OUTPATIENT
Start: 2017-09-22 | End: 2017-09-24

## 2017-09-22 RX ORDER — POLYETHYLENE GLYCOL 3350 17 G/17G
17 POWDER, FOR SOLUTION ORAL DAILY
Qty: 0 | Refills: 0 | Status: DISCONTINUED | OUTPATIENT
Start: 2017-09-22 | End: 2017-09-24

## 2017-09-22 RX ADMIN — VALSARTAN 160 MILLIGRAM(S): 80 TABLET ORAL at 05:29

## 2017-09-22 RX ADMIN — CARVEDILOL PHOSPHATE 3.12 MILLIGRAM(S): 80 CAPSULE, EXTENDED RELEASE ORAL at 18:39

## 2017-09-22 RX ADMIN — Medication 250 MILLIGRAM(S): at 00:06

## 2017-09-22 RX ADMIN — Medication 200 MILLIGRAM(S): at 11:00

## 2017-09-22 RX ADMIN — Medication 200 MILLIGRAM(S): at 09:59

## 2017-09-22 RX ADMIN — ENOXAPARIN SODIUM 40 MILLIGRAM(S): 100 INJECTION SUBCUTANEOUS at 05:29

## 2017-09-22 RX ADMIN — Medication 81 MILLIGRAM(S): at 12:29

## 2017-09-22 RX ADMIN — CARVEDILOL PHOSPHATE 3.12 MILLIGRAM(S): 80 CAPSULE, EXTENDED RELEASE ORAL at 05:29

## 2017-09-22 RX ADMIN — SODIUM CHLORIDE 75 MILLILITER(S): 9 INJECTION INTRAMUSCULAR; INTRAVENOUS; SUBCUTANEOUS at 21:53

## 2017-09-22 RX ADMIN — Medication 250 MILLIGRAM(S): at 18:38

## 2017-09-22 RX ADMIN — Medication 100 MILLIGRAM(S): at 12:29

## 2017-09-22 RX ADMIN — SENNA PLUS 1 TABLET(S): 8.6 TABLET ORAL at 21:52

## 2017-09-22 RX ADMIN — POLYETHYLENE GLYCOL 3350 17 GRAM(S): 17 POWDER, FOR SOLUTION ORAL at 21:52

## 2017-09-22 RX ADMIN — SODIUM CHLORIDE 75 MILLILITER(S): 9 INJECTION INTRAMUSCULAR; INTRAVENOUS; SUBCUTANEOUS at 00:06

## 2017-09-22 RX ADMIN — Medication 1 TABLET(S): at 12:29

## 2017-09-22 NOTE — CONSULT NOTE ADULT - SUBJECTIVE AND OBJECTIVE BOX
Patient is a 89y old  Female who presents with a chief complaint of R hand swelling, pain, redness (21 Sep 2017 16:26)      HPI:    This is a 89F with history of HTN and HLD who presents to the hospital with complaints of R hand swelling, pain, and redness for the past 3 days (since ). Patient was recently hospitalized at [facility] from 9/15 to  for constipation, was discharged in the afternoon of  and said that by that night she had noted severe R hand swelling, pain, warmth and redness. She presented to the hospital on  for evaluation and had a CDU stay where she was given IV clindamycin with significant improvement in her symptoms and was discharged with oral clindamycin. Said that after the second discharge her hand started to swell again and she presented back to the hospital on  for evaluation. Patient states that she did have an IV placed at the dorsal aspect of her R hand during her initial hospitalization but had not noted any pain, swelling, redness, or pustular drainage associated with it, said that she was having some R thumb pain on  prior to her discharge but was not associated with any other symptoms and thought the pain might have been related to her underlying arthritis. Currently her R hand is still swollen, painful and red. She denies any fevers, chills, or diaphoresis, and denies any other complaints.    In the ED, her vitals were T 98.1, P 71, /68, R 17, O2 sat 95% RA. Her current lab work shows some worsening transaminitis and hyponatremia but nl WBC (previous lab work from  showed leukocytosis with neutrophilia). She was given vancomycin 1g IVPB and was admitted to medicine. (21 Sep 2017 03:51)    Pt has noted improvement since starting IV vancomycin.  Seen by orthopedics, no intervention deemed necessary at this time.  ID consult called for further antibiotic management.      REVIEW OF SYSTEMS:    CONSTITUTIONAL: No fever, weight loss, or fatigue  EYES: No eye pain, visual disturbances, or discharge  ENMT:  No sore throat  NECK: No pain or stiffness  RESPIRATORY: No cough, wheezing, chills or hemoptysis; No shortness of breath  CARDIOVASCULAR: No chest pain, palpitations, dizziness, or leg swelling  GASTROINTESTINAL: No abdominal or epigastric pain. No nausea, vomiting, or hematemesis; No diarrhea or constipation. No melena or hematochezia.  GENITOURINARY: No dysuria, frequency, hematuria, or incontinence  NEUROLOGICAL: No headaches, memory loss, loss of strength, numbness, or tremors  SKIN: Mild swelling of right hand  LYMPH NODES: No enlarged glands  MUSCULOSKELETAL: No joint pain or swelling; No muscle, back, or extremity pain      PAST MEDICAL & SURGICAL HISTORY:  Hyperlipidemia  Hypertension, unspecified type  H/O: : x2      Allergies    Benadryl (Hives)    Intolerances        FAMILY HISTORY:  No pertinent family history in first degree relatives      SOCIAL HISTORY:  Lives alone.  Denies smoking, ivdu, etoh      MEDICATIONS  (STANDING):  enoxaparin Injectable 40 milliGRAM(s) SubCutaneous every 24 hours  vancomycin  IVPB 1000 milliGRAM(s) IV Intermittent every 12 hours  lactobacillus acidophilus 1 Tablet(s) Oral daily  aspirin enteric coated 81 milliGRAM(s) Oral daily  valsartan 160 milliGRAM(s) Oral daily  carvedilol 3.125 milliGRAM(s) Oral every 12 hours  influenza   Vaccine 0.5 milliLiter(s) IntraMuscular once  docusate sodium 100 milliGRAM(s) Oral daily  sodium chloride 0.9%. 1000 milliLiter(s) (75 mL/Hr) IV Continuous <Continuous>    MEDICATIONS  (PRN):  ibuprofen  Tablet 200 milliGRAM(s) Oral every 8 hours PRN Mild to severe pain      Vital Signs Last 24 Hrs  T(C): 36.3 (22 Sep 2017 05:25), Max: 37.1 (21 Sep 2017 21:27)  T(F): 97.4 (22 Sep 2017 05:25), Max: 98.8 (21 Sep 2017 21:27)  HR: 72 (22 Sep 2017 05:25) (66 - 72)  BP: 150/72 (22 Sep 2017 05:25) (124/52 - 150/72)  BP(mean): --  RR: 18 (22 Sep 2017 05:25) (17 - 20)  SpO2: 100% (22 Sep 2017 05:25) (99% - 100%)    PHYSICAL EXAM:    GENERAL: NAD, well-groomed, well-developed  HEAD:  Atraumatic, Normocephalic  EYES: EOMI, PERRLA, conjunctiva and sclera clear  ENMT: No tonsillar erythema, exudates, or enlargement  NECK: Supple, No JVD  NERVOUS SYSTEM:  Alert & Oriented X3, Good concentration;   CHEST/LUNG: Clear to percussion bilaterally; No rales, rhonchi, wheezing, or rubs  HEART: Regular rate and rhythm; No murmurs, rubs, or gallops  ABDOMEN: Soft, Nontender, Nondistended; Bowel sounds present  EXTREMITIES:  2+ Peripheral Pulses, No clubbing, cyanosis, or edema  LYMPH: No lymphadenopathy noted  SKIN: Rt hand faint erythema of dorsum. No palpable cord or fluctuance  Musculoskel:  Good range of motion of R wrist and fingers.  Chronic joint deformities related to RA    LABS:  CBC Full  -  ( 22 Sep 2017 06:20 )  WBC Count : 6.62 K/uL  Hemoglobin : 9.9 g/dL  Hematocrit : 29.5 %  Platelet Count - Automated : 299 K/uL  Mean Cell Volume : 86.0 fL  Mean Cell Hemoglobin : 28.9 pg  Mean Cell Hemoglobin Concentration : 33.6 %  Auto Neutrophil # : x  Auto Lymphocyte # : x  Auto Monocyte # : x  Auto Eosinophil # : x  Auto Basophil # : x  Auto Neutrophil % : x  Auto Lymphocyte % : x  Auto Monocyte % : x  Auto Eosinophil % : x  Auto Basophil % : x          139  |  106  |  9   ----------------------------<  93  3.9   |  20<L>  |  0.63    Ca    7.5<L>      22 Sep 2017 06:20    TPro  6.0  /  Alb  3.5  /  TBili  0.3  /  DBili  0.1  /  AST  116<H>  /  ALT  142<H>  /  AlkPhos  80  -21      LIVER FUNCTIONS - ( 21 Sep 2017 06:01 )  Alb: 3.5 g/dL / Pro: 6.0 g/dL / ALK PHOS: 80 u/L / ALT: 142 u/L / AST: 116 u/L / GGT: x                 MICROBIOLOGY:        Culture - Blood (09.15.17 @ 19:09)    Culture - Blood:   NO GROWTH AFTER 5 DAYS INCUBATION    Specimen Source: BLOOD    Culture - Urine (09.15.17 @ 10:29)    Culture - Urine:   NO GROWTH AT 24 HOURS    Specimen Source: URINE MIDSTREAM        RADIOLOGY:    < from: US Abdomen Limited (17 @ 15:41) >  FINDINGS:    Liver: Within normal limits.  Bile ducts: Normal caliber. Common hepatic duct measures 4 mm.   Gallbladder: Within normal limits. The focus of adenomyomatosis   identified on recent CT is not appreciated on this exam likely due to   location.     Pancreas: Visualized portions are within normal limits.  Right kidney: 10.7 cm. No hydronephrosis.  Ascites: None.  IVC: Visualized portions are within normal limits.    IMPRESSION:     No abnormality. Theindeterminate lesion in right hepatic lobe seen on   recent CT is too small to resolve on this exam.    < end of copied text >

## 2017-09-22 NOTE — CONSULT NOTE ADULT - ASSESSMENT
89F with history of HTN and HLD who presents to the hospital with complaints of R hand swelling, pain, and redness concerning for R hand cellulitis.  Pt recently readmitted in ER, giving IV clinda and changed to PO clinda.  No fever or WBC.  Currently improving on IV vancomycin.  Transaminitis.    Recommend:    - cont IV vancomycin for now.  Blood cultures negative to date.  Seen by orthopedics, no intervention needed.  Maintain trough between 15-20    - Trend LFTs.  Statin on hold.  Abdominal US unremarkable.  Check cmv, ebv, hepatitis panel          Thank you for opportunity to participate in the care of this patient.    Will follow.    Daysi Butcher    [phone] 89F with history of HTN and HLD who presents to the hospital with complaints of R hand swelling, pain, and redness concerning for R hand cellulitis.  Pt recently readmitted in ER, giving IV clinda and changed to PO clinda.  No fever or WBC.  Currently improving on IV vancomycin.  Transaminitis.    Recommend:    - cont IV vancomycin for now.  Blood cultures negative to date.  Seen by orthopedics, no intervention needed.  Maintain trough between 15-20    - Trend LFTs.  Statin on hold.  Abdominal US unremarkable.            Thank you for opportunity to participate in the care of this patient.    Will follow.    Daysi Butcher    [phone] 89F with history of HTN and HLD who presents to the hospital with complaints of R hand swelling, pain, and redness concerning for R hand cellulitis.  Pt recently readmitted in ER, giving IV clinda and changed to PO clinda.  No fever or WBC.  Currently improving on IV vancomycin.  Transaminitis.    Recommend:    - cont IV vancomycin for now.  Blood cultures negative to date.  Patient improving. Seen by orthopedics, no intervention needed.  Maintain trough between 15-20    - If develops any worsening swelling, joint effusions, or fluctuance check CT hand    - Trend LFTs.  Statin on hold.  Abdominal US unremarkable.            Thank you for opportunity to participate in the care of this patient.    Will follow.    Daysi Butcher    [phone]

## 2017-09-23 LAB
BUN SERPL-MCNC: 10 MG/DL — SIGNIFICANT CHANGE UP (ref 7–23)
CALCIUM SERPL-MCNC: 8 MG/DL — LOW (ref 8.4–10.5)
CHLORIDE SERPL-SCNC: 105 MMOL/L — SIGNIFICANT CHANGE UP (ref 98–107)
CO2 SERPL-SCNC: 22 MMOL/L — SIGNIFICANT CHANGE UP (ref 22–31)
CREAT SERPL-MCNC: 0.6 MG/DL — SIGNIFICANT CHANGE UP (ref 0.5–1.3)
GLUCOSE SERPL-MCNC: 123 MG/DL — HIGH (ref 70–99)
HCT VFR BLD CALC: 30.8 % — LOW (ref 34.5–45)
HGB BLD-MCNC: 10 G/DL — LOW (ref 11.5–15.5)
MCHC RBC-ENTMCNC: 28.2 PG — SIGNIFICANT CHANGE UP (ref 27–34)
MCHC RBC-ENTMCNC: 32.5 % — SIGNIFICANT CHANGE UP (ref 32–36)
MCV RBC AUTO: 87 FL — SIGNIFICANT CHANGE UP (ref 80–100)
NRBC # FLD: 0 — SIGNIFICANT CHANGE UP
PLATELET # BLD AUTO: 317 K/UL — SIGNIFICANT CHANGE UP (ref 150–400)
PMV BLD: 9.7 FL — SIGNIFICANT CHANGE UP (ref 7–13)
POTASSIUM SERPL-MCNC: 4 MMOL/L — SIGNIFICANT CHANGE UP (ref 3.5–5.3)
POTASSIUM SERPL-SCNC: 4 MMOL/L — SIGNIFICANT CHANGE UP (ref 3.5–5.3)
RBC # BLD: 3.54 M/UL — LOW (ref 3.8–5.2)
RBC # FLD: 15.1 % — HIGH (ref 10.3–14.5)
SODIUM SERPL-SCNC: 138 MMOL/L — SIGNIFICANT CHANGE UP (ref 135–145)
WBC # BLD: 7.48 K/UL — SIGNIFICANT CHANGE UP (ref 3.8–10.5)
WBC # FLD AUTO: 7.48 K/UL — SIGNIFICANT CHANGE UP (ref 3.8–10.5)

## 2017-09-23 RX ORDER — HYDRALAZINE HCL 50 MG
5 TABLET ORAL ONCE
Qty: 0 | Refills: 0 | Status: COMPLETED | OUTPATIENT
Start: 2017-09-23 | End: 2017-09-23

## 2017-09-23 RX ADMIN — Medication 5 MILLIGRAM(S): at 06:47

## 2017-09-23 RX ADMIN — CARVEDILOL PHOSPHATE 3.12 MILLIGRAM(S): 80 CAPSULE, EXTENDED RELEASE ORAL at 17:49

## 2017-09-23 RX ADMIN — Medication 200 MILLIGRAM(S): at 09:50

## 2017-09-23 RX ADMIN — CARVEDILOL PHOSPHATE 3.12 MILLIGRAM(S): 80 CAPSULE, EXTENDED RELEASE ORAL at 05:12

## 2017-09-23 RX ADMIN — Medication 250 MILLIGRAM(S): at 17:48

## 2017-09-23 RX ADMIN — ENOXAPARIN SODIUM 40 MILLIGRAM(S): 100 INJECTION SUBCUTANEOUS at 05:13

## 2017-09-23 RX ADMIN — Medication 1 TABLET(S): at 11:06

## 2017-09-23 RX ADMIN — VALSARTAN 160 MILLIGRAM(S): 80 TABLET ORAL at 05:12

## 2017-09-23 RX ADMIN — Medication 250 MILLIGRAM(S): at 05:12

## 2017-09-23 RX ADMIN — Medication 81 MILLIGRAM(S): at 11:06

## 2017-09-23 RX ADMIN — Medication 200 MILLIGRAM(S): at 10:20

## 2017-09-24 VITALS — HEART RATE: 75 BPM | DIASTOLIC BLOOD PRESSURE: 70 MMHG | SYSTOLIC BLOOD PRESSURE: 153 MMHG

## 2017-09-24 LAB
BUN SERPL-MCNC: 8 MG/DL — SIGNIFICANT CHANGE UP (ref 7–23)
CALCIUM SERPL-MCNC: 8.4 MG/DL — SIGNIFICANT CHANGE UP (ref 8.4–10.5)
CHLORIDE SERPL-SCNC: 103 MMOL/L — SIGNIFICANT CHANGE UP (ref 98–107)
CO2 SERPL-SCNC: 21 MMOL/L — LOW (ref 22–31)
CREAT SERPL-MCNC: 0.54 MG/DL — SIGNIFICANT CHANGE UP (ref 0.5–1.3)
GLUCOSE SERPL-MCNC: 107 MG/DL — HIGH (ref 70–99)
HCT VFR BLD CALC: 32.9 % — LOW (ref 34.5–45)
HGB BLD-MCNC: 10.8 G/DL — LOW (ref 11.5–15.5)
MCHC RBC-ENTMCNC: 29.1 PG — SIGNIFICANT CHANGE UP (ref 27–34)
MCHC RBC-ENTMCNC: 32.8 % — SIGNIFICANT CHANGE UP (ref 32–36)
MCV RBC AUTO: 88.7 FL — SIGNIFICANT CHANGE UP (ref 80–100)
NRBC # FLD: 0 — SIGNIFICANT CHANGE UP
PLATELET # BLD AUTO: 319 K/UL — SIGNIFICANT CHANGE UP (ref 150–400)
PMV BLD: 9.4 FL — SIGNIFICANT CHANGE UP (ref 7–13)
POTASSIUM SERPL-MCNC: 3.8 MMOL/L — SIGNIFICANT CHANGE UP (ref 3.5–5.3)
POTASSIUM SERPL-SCNC: 3.8 MMOL/L — SIGNIFICANT CHANGE UP (ref 3.5–5.3)
RBC # BLD: 3.71 M/UL — LOW (ref 3.8–5.2)
RBC # FLD: 14.9 % — HIGH (ref 10.3–14.5)
SODIUM SERPL-SCNC: 137 MMOL/L — SIGNIFICANT CHANGE UP (ref 135–145)
WBC # BLD: 6.75 K/UL — SIGNIFICANT CHANGE UP (ref 3.8–10.5)
WBC # FLD AUTO: 6.75 K/UL — SIGNIFICANT CHANGE UP (ref 3.8–10.5)

## 2017-09-24 RX ORDER — SIMVASTATIN 20 MG/1
1 TABLET, FILM COATED ORAL
Qty: 0 | Refills: 0 | COMMUNITY

## 2017-09-24 RX ORDER — IBUPROFEN 200 MG
1 TABLET ORAL
Qty: 0 | Refills: 0 | COMMUNITY
Start: 2017-09-24

## 2017-09-24 RX ORDER — POLYETHYLENE GLYCOL 3350 17 G/17G
17 POWDER, FOR SOLUTION ORAL
Qty: 1 | Refills: 0 | OUTPATIENT
Start: 2017-09-24

## 2017-09-24 RX ORDER — DOCUSATE SODIUM 100 MG
1 CAPSULE ORAL
Qty: 7 | Refills: 0 | OUTPATIENT
Start: 2017-09-24 | End: 2017-10-01

## 2017-09-24 RX ORDER — AMOXICILLIN 250 MG/5ML
1 SUSPENSION, RECONSTITUTED, ORAL (ML) ORAL
Qty: 14 | Refills: 0 | OUTPATIENT
Start: 2017-09-24 | End: 2017-10-01

## 2017-09-24 RX ADMIN — Medication 250 MILLIGRAM(S): at 05:13

## 2017-09-24 RX ADMIN — ENOXAPARIN SODIUM 40 MILLIGRAM(S): 100 INJECTION SUBCUTANEOUS at 05:14

## 2017-09-24 RX ADMIN — Medication 81 MILLIGRAM(S): at 12:09

## 2017-09-24 RX ADMIN — Medication 200 MILLIGRAM(S): at 10:32

## 2017-09-24 RX ADMIN — Medication 1 TABLET(S): at 12:09

## 2017-09-24 RX ADMIN — Medication 200 MILLIGRAM(S): at 10:02

## 2017-09-24 RX ADMIN — VALSARTAN 160 MILLIGRAM(S): 80 TABLET ORAL at 05:13

## 2017-09-24 RX ADMIN — CARVEDILOL PHOSPHATE 3.12 MILLIGRAM(S): 80 CAPSULE, EXTENDED RELEASE ORAL at 05:13

## 2017-09-24 NOTE — PROGRESS NOTE ADULT - ATTENDING COMMENTS
I d/w pt's son and daughter in law extensively at bedside. all questions answered
I d/w pt's son  extensively at bedside. all questions answered  d/c planning for home today. Time 40 min
Dispo: pending improvement of cellulitis and transaminitis

## 2017-09-24 NOTE — PROVIDER CONTACT NOTE (OTHER) - RECOMMENDATIONS
give hydralazine and reassess in one hr
give patient due medication and reassess in 1 hr. continue to monitor patient
monitor for now
reassess in an hour and half

## 2017-09-24 NOTE — PROGRESS NOTE ADULT - PROBLEM SELECTOR PLAN 3
possibly due to poor PO intake  IVF hydration  AM BMP today pending
resolved, likely due to mild dehydration   IVF hydration
resolved, likely due to mild dehydration
resolved, likely due to mild dehydration   IVF hydration

## 2017-09-24 NOTE — PROVIDER CONTACT NOTE (OTHER) - ASSESSMENT
Pt /100  no c/o of chest pain, shortness of breathe or distress noted
patient alert and awake. asymptomatic. denies s/s of headache or dizziness
Pt /70  manually P 75 palpated  no c/o of chest pain, shortness of breathe or distress noted
Pt /89  no c/o of chest pain, shortness of breathe or distress noted

## 2017-09-24 NOTE — PROGRESS NOTE ADULT - SUBJECTIVE AND OBJECTIVE BOX
Infectious Diseases progress note:    Subjective:  States feeling better.  Right hand still with mild swelling and faint petechial rash over dorsum and fingers    ROS:  CONSTITUTIONAL:  No fever, chills, rigors  CARDIOVASCULAR:  No chest pain or palpitations  RESPIRATORY:   No SOB, cough, dyspnea on exertion.  No wheezing  GASTROINTESTINAL:  No abd pain, N/V, diarrhea/constipation  EXTREMITIES:  No swelling or joint pain  GENITOURINARY:  No burning on urination, increased frequency or urgency.  No flank pain  NEUROLOGIC:  No HA, visual disturbances  SKIN: faint petechial rash over dorsum of right hand    Allergies    Benadryl (Hives)    Intolerances        ANTIBIOTICS/RELEVANT:  antimicrobials  vancomycin  IVPB 1000 milliGRAM(s) IV Intermittent every 12 hours    immunologic:  influenza   Vaccine 0.5 milliLiter(s) IntraMuscular once    OTHER:  enoxaparin Injectable 40 milliGRAM(s) SubCutaneous every 24 hours  lactobacillus acidophilus 1 Tablet(s) Oral daily  aspirin enteric coated 81 milliGRAM(s) Oral daily  valsartan 160 milliGRAM(s) Oral daily  carvedilol 3.125 milliGRAM(s) Oral every 12 hours  docusate sodium 100 milliGRAM(s) Oral daily  ibuprofen  Tablet 200 milliGRAM(s) Oral every 8 hours PRN  senna 1 Tablet(s) Oral at bedtime  polyethylene glycol 3350 17 Gram(s) Oral daily      Objective:  Vital Signs Last 24 Hrs  T(C): 36.7 (23 Sep 2017 15:31), Max: 36.8 (22 Sep 2017 22:20)  T(F): 98.1 (23 Sep 2017 15:31), Max: 98.2 (22 Sep 2017 22:20)  HR: 76 (23 Sep 2017 17:48) (67 - 97)  BP: 131/58 (23 Sep 2017 17:48) (123/55 - 190/100)  BP(mean): --  RR: 16 (23 Sep 2017 15:31) (16 - 18)  SpO2: 96% (23 Sep 2017 15:31) (96% - 100%)    PHYSICAL EXAM:  Constitutional:NAD  Eyes:DELGADO, EOMI  Ear/Nose/Throat: no oral lesion, no sinus tenderness on percussion	  Neck:no JVD, no lymphadenopathy, supple  Respiratory: CTA joid  Cardiovascular: S1S2 RRR, no murmurs  Gastrointestinal:soft, (+) BS, no HSM  Extremities:no e/e/c.  Mild swelling right dorsum.  RA joint deformities  Skin:  right hand with faint petechial type rash over dorsum and fingers.            LABS:                        10.0   7.48  )-----------( 317      ( 23 Sep 2017 05:40 )             30.8     09-23    138  |  105  |  10  ----------------------------<  123<H>  4.0   |  22  |  0.60    Ca    8.0<L>      23 Sep 2017 05:40          Vancomycin Level, Trough: 12.5 ug/mL (09-22 @ 15:35)      MICROBIOLOGY:    Culture - Blood (09.15.17 @ 19:09)    Culture - Blood:   NO GROWTH AFTER 5 DAYS INCUBATION    Specimen Source: BLOOD    Culture - Urine (09.15.17 @ 10:29)    Culture - Urine:   NO GROWTH AT 24 HOURS    Specimen Source: URINE MIDSTREAM          RADIOLOGY & ADDITIONAL STUDIES:
Patient is a 89y old  Female who presents with a chief complaint of R hand swelling, pain, redness (21 Sep 2017 16:26)      SUBJECTIVE / OVERNIGHT EVENTS:  R hand swelling and rash improved a lot. Pt feels well. + BM    MEDICATIONS  (STANDING):  enoxaparin Injectable 40 milliGRAM(s) SubCutaneous every 24 hours  vancomycin  IVPB 1000 milliGRAM(s) IV Intermittent every 12 hours  lactobacillus acidophilus 1 Tablet(s) Oral daily  aspirin enteric coated 81 milliGRAM(s) Oral daily  valsartan 160 milliGRAM(s) Oral daily  carvedilol 3.125 milliGRAM(s) Oral every 12 hours  influenza   Vaccine 0.5 milliLiter(s) IntraMuscular once  docusate sodium 100 milliGRAM(s) Oral daily  senna 1 Tablet(s) Oral at bedtime  polyethylene glycol 3350 17 Gram(s) Oral daily    MEDICATIONS  (PRN):  ibuprofen  Tablet 200 milliGRAM(s) Oral every 8 hours PRN Mild to severe pain      T(C): 37.1 (09-24-17 @ 05:08), Max: 37.1 (09-24-17 @ 05:08)  HR: 75 (09-24-17 @ 10:07) (65 - 81)  BP: 153/70 (09-24-17 @ 10:07) (123/55 - 190/89)  RR: 18 (09-24-17 @ 05:08) (16 - 18)  SpO2: 97% (09-24-17 @ 05:08) (96% - 97%)  CAPILLARY BLOOD GLUCOSE        I&O's Summary      PHYSICAL EXAM:  GENERAL: NAD, well-developed  HEAD:  Atraumatic, Normocephalic  EYES: EOMI, PERRLA, conjunctiva and sclera clear  NECK: Supple, No JVD  CHEST/LUNG: Clear to auscultation bilaterally; No wheeze  HEART: s1 s2, regular rhythm and rate   ABDOMEN: Soft, Nontender, Nondistended; Bowel sounds present  EXTREMITIES:  2+ Peripheral Pulses, No clubbing, cyanosis, or edema  PSYCH: AAOx3, calm   NEUROLOGY: non-focal  SKIN: No rashes or lesions    LABS:                        10.8   6.75  )-----------( 319      ( 24 Sep 2017 06:30 )             32.9     09-24    137  |  103  |  8   ----------------------------<  107<H>  3.8   |  21<L>  |  0.54    Ca    8.4      24 Sep 2017 06:30                RADIOLOGY & ADDITIONAL TESTS:    Imaging Personally Reviewed:    Consultant(s) Notes Reviewed:      Care Discussed with Consultants/Other Providers: Dr. Butcher regarding oral abx
Infectious Diseases progress note:    Subjective:  Feeling much better.  Rt hand improved swelling, decreased erythema.  No fevers, chills, rigors.  Son present at bedside.    ROS:  CONSTITUTIONAL:  No fever, chills, rigors  CARDIOVASCULAR:  No chest pain or palpitations  RESPIRATORY:   No SOB, cough, dyspnea on exertion.  No wheezing  GASTROINTESTINAL:  No abd pain, N/V, diarrhea/constipation  EXTREMITIES:  Decreased rt hand dorsal swelling.  Rash resolving  GENITOURINARY:  No burning on urination, increased frequency or urgency.  No flank pain  NEUROLOGIC:  No HA, visual disturbances  SKIN: No rashes    Allergies    Benadryl (Hives)    Intolerances        ANTIBIOTICS/RELEVANT:  antimicrobials  vancomycin  IVPB 1000 milliGRAM(s) IV Intermittent every 12 hours    immunologic:  influenza   Vaccine 0.5 milliLiter(s) IntraMuscular once    OTHER:  enoxaparin Injectable 40 milliGRAM(s) SubCutaneous every 24 hours  lactobacillus acidophilus 1 Tablet(s) Oral daily  aspirin enteric coated 81 milliGRAM(s) Oral daily  valsartan 160 milliGRAM(s) Oral daily  carvedilol 3.125 milliGRAM(s) Oral every 12 hours  docusate sodium 100 milliGRAM(s) Oral daily  ibuprofen  Tablet 200 milliGRAM(s) Oral every 8 hours PRN  senna 1 Tablet(s) Oral at bedtime  polyethylene glycol 3350 17 Gram(s) Oral daily      Objective:  Vital Signs Last 24 Hrs  T(C): 37.1 (24 Sep 2017 05:08), Max: 37.1 (24 Sep 2017 05:08)  T(F): 98.8 (24 Sep 2017 05:08), Max: 98.8 (24 Sep 2017 05:08)  HR: 75 (24 Sep 2017 10:07) (65 - 81)  BP: 153/70 (24 Sep 2017 10:07) (131/58 - 190/89)  BP(mean): --  RR: 18 (24 Sep 2017 05:08) (18 - 18)  SpO2: 97% (24 Sep 2017 05:08) (96% - 97%)    PHYSICAL EXAM:  Constitutional:NAD  Eyes:DELGADO, EOMI  Ear/Nose/Throat: no oral lesion, no sinus tenderness on percussion	  Neck:no JVD, no lymphadenopathy, supple  Respiratory: CTA ojdi  Cardiovascular: S1S2 RRR, no murmurs  Gastrointestinal:soft, (+) BS, no HSM  Extremities:no e/e/c.  Rt hand swelling improved over dorsum.  No erythema.  Chronic RA joint deformities.        LABS:                        10.8   6.75  )-----------( 319      ( 24 Sep 2017 06:30 )             32.9     09-24    137  |  103  |  8   ----------------------------<  107<H>  3.8   |  21<L>  |  0.54    Ca    8.4      24 Sep 2017 06:30      Vancomycin Level, Trough: 12.5 ug/mL (09-22 @ 15:35)      MICROBIOLOGY:    Culture - Blood (09.15.17 @ 19:09)    Culture - Blood:   NO GROWTH AFTER 5 DAYS INCUBATION    Specimen Source: BLOOD    Culture - Urine (09.15.17 @ 10:29)    Culture - Urine:   NO GROWTH AT 24 HOURS    Specimen Source: URINE MIDSTREAM          RADIOLOGY & ADDITIONAL STUDIES:
Patient is a 89y old  Female who presents with a chief complaint of R hand swelling, pain, redness (21 Sep 2017 16:26)      SUBJECTIVE / OVERNIGHT EVENTS:  R hand swelling continues to improve with faint rash on the dorsum of the hand. No headache / vision change / cp.       MEDICATIONS  (STANDING):  enoxaparin Injectable 40 milliGRAM(s) SubCutaneous every 24 hours  vancomycin  IVPB 1000 milliGRAM(s) IV Intermittent every 12 hours  lactobacillus acidophilus 1 Tablet(s) Oral daily  aspirin enteric coated 81 milliGRAM(s) Oral daily  valsartan 160 milliGRAM(s) Oral daily  carvedilol 3.125 milliGRAM(s) Oral every 12 hours  influenza   Vaccine 0.5 milliLiter(s) IntraMuscular once  docusate sodium 100 milliGRAM(s) Oral daily  senna 1 Tablet(s) Oral at bedtime  polyethylene glycol 3350 17 Gram(s) Oral daily    MEDICATIONS  (PRN):  ibuprofen  Tablet 200 milliGRAM(s) Oral every 8 hours PRN Mild to severe pain      T(C): 36.7 (09-23-17 @ 15:31), Max: 36.8 (09-22-17 @ 22:20)  HR: 76 (09-23-17 @ 17:48) (67 - 97)  BP: 131/58 (09-23-17 @ 17:48) (123/55 - 190/100)  RR: 16 (09-23-17 @ 15:31) (16 - 18)  SpO2: 96% (09-23-17 @ 15:31) (96% - 100%)  CAPILLARY BLOOD GLUCOSE        I&O's Summary      PHYSICAL EXAM:  GENERAL: NAD, well-developed  HEAD:  Atraumatic, Normocephalic  EYES: EOMI, PERRLA, conjunctiva and sclera clear  NECK: Supple, No JVD  CHEST/LUNG: Clear to auscultation bilaterally; No wheeze  HEART: s1 s2, regular rhythm and rate   ABDOMEN: Soft, Nontender, Nondistended; Bowel sounds present  EXTREMITIES:  2+ Peripheral Pulses, No clubbing, cyanosis, or edema. + mild swelling and faint rash on the dorsum of the right hand   PSYCH: AAOx3, calm   NEUROLOGY: non-focal  SKIN: No rashes or lesions    LABS:                        10.0   7.48  )-----------( 317      ( 23 Sep 2017 05:40 )             30.8     09-23    138  |  105  |  10  ----------------------------<  123<H>  4.0   |  22  |  0.60    Ca    8.0<L>      23 Sep 2017 05:40                RADIOLOGY & ADDITIONAL TESTS:    Imaging Personally Reviewed:    Consultant(s) Notes Reviewed:      Care Discussed with Consultants/Other Providers:
Patient is a 89y old  Female who presents with a chief complaint of R hand swelling, pain, redness (21 Sep 2017 03:51)      SUBJECTIVE / OVERNIGHT EVENTS: No acute events overnight. Feels right hand swelling and redness is improved. Denies chest pain, SOB, N/V/D.    MEDICATIONS  (STANDING):  enoxaparin Injectable 40 milliGRAM(s) SubCutaneous every 24 hours  vancomycin  IVPB 1000 milliGRAM(s) IV Intermittent every 12 hours  lactobacillus acidophilus 1 Tablet(s) Oral daily  aspirin enteric coated 81 milliGRAM(s) Oral daily  valsartan 160 milliGRAM(s) Oral daily  carvedilol 3.125 milliGRAM(s) Oral every 12 hours  influenza   Vaccine 0.5 milliLiter(s) IntraMuscular once  docusate sodium 100 milliGRAM(s) Oral daily  sodium chloride 0.9%. 1000 milliLiter(s) (75 mL/Hr) IV Continuous <Continuous>    MEDICATIONS  (PRN):      T(C): 36.6 (09-21-17 @ 06:20), Max: 36.9 (09-21-17 @ 02:13)  HR: 74 (09-21-17 @ 06:20) (66 - 74)  BP: 167/78 (09-21-17 @ 06:20) (157/68 - 167/78)  RR: 18 (09-21-17 @ 06:20) (16 - 18)  SpO2: 100% (09-21-17 @ 06:20) (95% - 100%)  CAPILLARY BLOOD GLUCOSE        I&O's Summary      PHYSICAL EXAM:  GENERAL: no apparent distress, on room air  EYES: sclera clear b/l  CHEST/LUNG: Clear to auscultation bilaterally; No wheezing or crackles  HEART: Regular rate and rhythm; No murmurs appreciated  ABDOMEN: Soft, Nontender, Nondistended; Bowel sounds present  EXTREMITIES: mild nonpitting pedal edema  MSK: swelling of right wrist with warmth, some pain with wrist flexion but overall ROM intact  NEUROLOGY: awake, alert, responds to Qs appropriately, no focal deficits  SKIN: chronic venous stasis changes of lower legs    LABS:                        11.5   9.00  )-----------( 311      ( 20 Sep 2017 23:50 )             35.2     09-20    130<L>  |  96<L>  |  17  ----------------------------<  105<H>  4.2   |  19<L>  |  0.83    Ca    7.7<L>      20 Sep 2017 23:50    TPro  6.0  /  Alb  3.5  /  TBili  0.3  /  DBili  0.1  /  AST  116<H>  /  ALT  142<H>  /  AlkPhos  80  09-21              RADIOLOGY & ADDITIONAL TESTS:
Patient is a 89y old  Female who presents with a chief complaint of R hand swelling, pain, redness (21 Sep 2017 16:26)      SUBJECTIVE / OVERNIGHT EVENTS:  Pt feels well. Swelling in the dorsum of right hand improved a lot. + BM    MEDICATIONS  (STANDING):  enoxaparin Injectable 40 milliGRAM(s) SubCutaneous every 24 hours  vancomycin  IVPB 1000 milliGRAM(s) IV Intermittent every 12 hours  lactobacillus acidophilus 1 Tablet(s) Oral daily  aspirin enteric coated 81 milliGRAM(s) Oral daily  valsartan 160 milliGRAM(s) Oral daily  carvedilol 3.125 milliGRAM(s) Oral every 12 hours  influenza   Vaccine 0.5 milliLiter(s) IntraMuscular once  docusate sodium 100 milliGRAM(s) Oral daily  sodium chloride 0.9%. 1000 milliLiter(s) (75 mL/Hr) IV Continuous <Continuous>  senna 1 Tablet(s) Oral at bedtime    MEDICATIONS  (PRN):  ibuprofen  Tablet 200 milliGRAM(s) Oral every 8 hours PRN Mild to severe pain      T(C): 36.3 (09-22-17 @ 05:25), Max: 37.1 (09-21-17 @ 21:27)  HR: 72 (09-22-17 @ 05:25) (66 - 72)  BP: 150/72 (09-22-17 @ 05:25) (124/52 - 150/72)  RR: 18 (09-22-17 @ 05:25) (17 - 20)  SpO2: 100% (09-22-17 @ 05:25) (99% - 100%)  CAPILLARY BLOOD GLUCOSE        I&O's Summary      PHYSICAL EXAM:  GENERAL: NAD, well-developed  HEAD:  Atraumatic, Normocephalic  EYES: EOMI, PERRLA, conjunctiva and sclera clear  NECK: Supple, No JVD  CHEST/LUNG: Clear to auscultation bilaterally; No wheeze  HEART: s1 s2, regular rhythm and rate   ABDOMEN: Soft, Nontender, Nondistended; Bowel sounds present  EXTREMITIES:  2+ Peripheral Pulses, No clubbing, cyanosis, or edema  PSYCH: AAOx3, calm   NEUROLOGY: non-focal  SKIN: No rashes or lesions    LABS:                        9.9    6.62  )-----------( 299      ( 22 Sep 2017 06:20 )             29.5     09-22    139  |  106  |  9   ----------------------------<  93  3.9   |  20<L>  |  0.63    Ca    7.5<L>      22 Sep 2017 06:20    TPro  6.0  /  Alb  3.5  /  TBili  0.3  /  DBili  0.1  /  AST  116<H>  /  ALT  142<H>  /  AlkPhos  80  09-21              RADIOLOGY & ADDITIONAL TESTS:    Imaging Personally Reviewed:    Consultant(s) Notes Reviewed:      Care Discussed with Consultants/Other Providers:

## 2017-09-24 NOTE — PROGRESS NOTE ADULT - PROBLEM SELECTOR PLAN 4
- c/w home meds
- c/w home meds
- c/w home meds  episode of hypertensive urgency, resolved, likely stress induced
- c/w home meds  episode of hypertensive urgency, resolved, likely stress induced

## 2017-09-24 NOTE — PROVIDER CONTACT NOTE (OTHER) - ACTION/TREATMENT ORDERED:
give due medications. continue to monitor patient. reassess in 1-2 hours.
will adm hydralazine and reassess in an hr
will continue to monitor.
will give 6 am BP meds

## 2017-09-24 NOTE — PROGRESS NOTE ADULT - PROBLEM SELECTOR PLAN 2
History     Chief Complaint   Patient presents with     Dental Pain     Currently on amoxicillin since Thursday and feels her lower mouth dental infection is getting worse. States both side now.      Patient is a 27 year old female presenting with tooth pain.   Dental Pain   Location:  Lower  Lower teeth location:  30/RL 1st molar and 31/RL 2nd molar  Quality:  Aching  Severity:  Moderate  Onset quality:  Gradual  Timing:  Intermittent  Chronicity:  Recurrent  Associated symptoms: no fever, no headaches and no neck pain        I have reviewed the Medications, Allergies, Past Medical and Surgical History, and Social History in the Epic system.        Review of Systems   Constitutional: Negative for chills, diaphoresis and fever.   HENT: Negative for voice change.    Eyes: Negative for visual disturbance.   Respiratory: Negative for cough, chest tightness, shortness of breath and wheezing.    Cardiovascular: Negative for chest pain, palpitations and leg swelling.   Gastrointestinal: Negative for abdominal distention, abdominal pain, anal bleeding, blood in stool, nausea and vomiting.   Genitourinary: Negative for decreased urine volume, dysuria, flank pain and hematuria.   Musculoskeletal: Negative for arthralgias, back pain, gait problem, myalgias, neck pain and neck stiffness.   Skin: Negative for color change, pallor, rash and wound.   Neurological: Negative for dizziness, syncope, light-headedness, numbness and headaches.   Psychiatric/Behavioral: Negative for confusion and suicidal ideas.       Physical Exam   BP: 121/73  Pulse: 72  Temp: 98.5  F (36.9  C)  Resp: 16  SpO2: 98 %  Physical Exam   Constitutional: She is oriented to person, place, and time. She appears well-developed and well-nourished.   HENT:   Head: Normocephalic and atraumatic.   Mouth/Throat: Uvula is midline. Normal dentition. No dental abscesses or dental caries. No oropharyngeal exudate, posterior oropharyngeal edema or posterior 
oropharyngeal erythema.       Multiple whitish spots on soft plates about 1 mm in size   Eyes: Conjunctivae are normal. Pupils are equal, round, and reactive to light.   Neck: Normal range of motion. Neck supple. No JVD present. No tracheal deviation present. No thyromegaly present.   Cardiovascular: Normal rate, regular rhythm, normal heart sounds and intact distal pulses.  Exam reveals no gallop and no friction rub.    No murmur heard.  Pulmonary/Chest: Effort normal and breath sounds normal. No stridor. No respiratory distress. She has no wheezes. She has no rales. She exhibits no tenderness.   Abdominal: Soft. Bowel sounds are normal. She exhibits no distension and no mass. There is no tenderness. There is no rebound and no guarding.   Musculoskeletal: Normal range of motion. She exhibits no edema or tenderness.   Lymphadenopathy:     She has no cervical adenopathy.   Neurological: She is alert and oriented to person, place, and time.   Skin: Skin is warm and dry. No rash noted. No erythema. No pallor.   Psychiatric: Her behavior is normal.   Nursing note and vitals reviewed.      ED Course     ED Course     Procedures               Labs Ordered and Resulted from Time of ED Arrival Up to the Time of Departure from the ED - No data to display    Assessments & Plan (with Medical Decision Making)   Dental pain,   Small whitish spot on soft palate, uncertain etiology , can be viral  No sign or symptoms of abscess or dental bacterial infection  NSAIDs for pain control  Dc home  I advised her to follow with dentis       I have reviewed the nursing notes.    I have reviewed the findings, diagnosis, plan and need for follow up with the patient.      New Prescriptions    NAPROXEN (NAPROSYN) 500 MG TABLET    Take 1 tablet (500 mg) by mouth 2 times daily (with meals) for 3 days       Final diagnoses:   Mouth sore   Pain, dental       8/9/2017   [facility]     David Barrera MD  08/09/17 [id number]    
hypodense liver lesion noted on CT A/P from 9/15/17  Improved slightly today  Check Abd US to further characterize hypodense lesion  Hold statin for now
hypodense liver lesion noted on CT A/P from 9/15/17  Improved slightly, mild, asymptomatic   Hold statin for now
hypodense liver lesion noted on CT A/P from 9/15/17  Improved slightly, mild, asymptomatic   Hold statin for now  US unremarkable
hypodense liver lesion noted on CT A/P from 9/15/17  Improved slightly, mild, asymptomatic   Hold statin for now

## 2017-09-24 NOTE — PROGRESS NOTE ADULT - PROBLEM SELECTOR PLAN 5
- holding home meds for now due to transaminitis

## 2017-09-24 NOTE — PROGRESS NOTE ADULT - PROBLEM SELECTOR PLAN 1
Worsened on PO clindamycin  Presently improving on IV Vanco- continue for now  Check Vanc trough pre 4th dose  No orthopedic intervention- recs appreciated
clinically improved   on IV Vanco- continue for now  Check Vanc trough pre 4th dose  No orthopedic intervention- recs appreciated  appreciate ID rec
clinically improved   on IV Vanco- continue for now. switched to Augmentin / Doxycycline per ID rec  No orthopedic intervention- recs appreciated  appreciate ID rec
clinically improved   on IV Vanco- continue for now  monitor Vanc trough  No orthopedic intervention- recs appreciated  appreciate ID rec

## 2017-09-24 NOTE — PROGRESS NOTE ADULT - ASSESSMENT
This is a 89F with history of HTN and HLD who presents to the hospital with complaints of R hand swelling, pain, and redness concerning for R hand cellulitis.
89F with history of HTN and HLD who presents to the hospital with complaints of R hand swelling, pain, and redness concerning for R hand cellulitis.  Pt recently readmitted in ER, giving IV clinda and changed to PO clinda.  No fever or WBC.  Currently improving on IV vancomycin.  Transaminitis.    Recommend:    - cont IV vancomycin for now.  Blood cultures negative to date.  Patient improving. Seen by orthopedics, no intervention needed.  Maintain trough between 15-20    - If develops any worsening swelling, joint effusions, or fluctuance check CT hand.  Thus far, stable from ID standpoint.    - Trend LFTs.  Statin on hold.  Abdominal US unremarkable.        - Anticipate changing to PO antibiotics in next 48 hours if continues to improve    Daysi Butcher    [phone]
This is a 89F with history of HTN and HLD who presents to the hospital with complaints of R hand swelling, pain, and redness concerning for R hand cellulitis.
89F with history of HTN and HLD who presents to the hospital with complaints of R hand swelling, pain, and redness concerning for R hand cellulitis.  Pt recently readmitted in ER, giving IV clinda and changed to PO clinda.  No fever or WBC.  Currently improving on IV vancomycin.  Transaminitis.    Recommend:    - Clinically much better. Switch to PO doxy 100mg bid and Amoxicillin 500mg bid for both staph and strep coverage (7 day course)    - Discussed risks of c.diff infection with antibiotics.  Patient and son expressed understanding.  Encouraged probiotics.    - Stable from ID standpoint.    Daysi Butcher    [phone]
This is a 89F with history of HTN and HLD who presents to the hospital with complaints of R hand swelling, pain, and redness concerning for R hand cellulitis.
This is a 89F with history of HTN and HLD who presents to the hospital with complaints of R hand swelling, pain, and redness concerning for R hand cellulitis.

## 2017-09-24 NOTE — PROGRESS NOTE ADULT - PROBLEM SELECTOR PLAN 6
- Lovenox 40mg sq for DVT ppx

## 2018-03-08 NOTE — PATIENT PROFILE ADULT. - AS SC BRADEN FRICTION
Note Text (......Xxx Chief Complaint.): This diagnosis correlates with the Detail Level: Simple Other (Free Text): Follow up in two months if not resolved. (3) no apparent problem

## 2018-08-31 ENCOUNTER — TRANSCRIPTION ENCOUNTER (OUTPATIENT)
Age: 83
End: 2018-08-31

## 2022-05-11 NOTE — ED PROVIDER NOTE - NS ED MD TWO NIGHTS YN
Quality 137: Melanoma: Continuity Of Care - Recall System: Patient information entered into a recall system that includes: target date for the next exam specified AND a process to follow up with patients regarding missed or unscheduled appointments
Quality 138: Melanoma: Coordination Of Care: A treatment plan was communicated to the physicians providing continuing care within one month of diagnosis outlining: diagnosis, tumor thickness and a plan for surgery or alternate care.
Detail Level: Detailed
Yes

## 2022-12-13 NOTE — ED CDU PROVIDER NOTE - NS ED MD DISPO DISCHARGE
Absence call- Phone call and message left to check on Ayana Prasad  Since Ayana Prasad did not attend today.    Kacey Tena, Psy., D,  L.P.     Home

## 2023-06-17 ENCOUNTER — EMERGENCY (EMERGENCY)
Facility: HOSPITAL | Age: 88
LOS: 1 days | Discharge: HOME | End: 2023-06-17
Attending: EMERGENCY MEDICINE | Admitting: EMERGENCY MEDICINE
Payer: MEDICARE

## 2023-06-17 VITALS
HEART RATE: 64 BPM | OXYGEN SATURATION: 96 % | DIASTOLIC BLOOD PRESSURE: 42 MMHG | RESPIRATION RATE: 17 BRPM | SYSTOLIC BLOOD PRESSURE: 101 MMHG | TEMPERATURE: 99 F

## 2023-06-17 VITALS
SYSTOLIC BLOOD PRESSURE: 90 MMHG | RESPIRATION RATE: 18 BRPM | TEMPERATURE: 98 F | DIASTOLIC BLOOD PRESSURE: 56 MMHG | HEART RATE: 108 BPM | OXYGEN SATURATION: 100 %

## 2023-06-17 DIAGNOSIS — Z98.891 HISTORY OF UTERINE SCAR FROM PREVIOUS SURGERY: Chronic | ICD-10-CM

## 2023-06-17 PROBLEM — E78.5 HYPERLIPIDEMIA, UNSPECIFIED: Chronic | Status: ACTIVE | Noted: 2017-09-15

## 2023-06-17 RX ORDER — ACETAMINOPHEN 500 MG
975 TABLET ORAL ONCE
Refills: 0 | Status: COMPLETED | OUTPATIENT
Start: 2023-06-17 | End: 2023-06-17

## 2023-06-17 RX ORDER — TETANUS TOXOID, REDUCED DIPHTHERIA TOXOID AND ACELLULAR PERTUSSIS VACCINE, ADSORBED 5; 2.5; 8; 8; 2.5 [IU]/.5ML; [IU]/.5ML; UG/.5ML; UG/.5ML; UG/.5ML
0.5 SUSPENSION INTRAMUSCULAR ONCE
Refills: 0 | Status: COMPLETED | OUTPATIENT
Start: 2023-06-17 | End: 2023-06-17

## 2023-06-17 RX ADMIN — TETANUS TOXOID, REDUCED DIPHTHERIA TOXOID AND ACELLULAR PERTUSSIS VACCINE, ADSORBED 0.5 MILLILITER(S): 5; 2.5; 8; 8; 2.5 SUSPENSION INTRAMUSCULAR at 12:10

## 2023-06-17 RX ADMIN — Medication 975 MILLIGRAM(S): at 12:10

## 2023-06-17 NOTE — ED PROVIDER NOTE - PHYSICAL EXAMINATION
CONSTITUTIONAL: NAD  SKIN: superficial skin tear of LLE with surrounding bruising over lower shin horizontally with a small vertical tear below; no signs of purulence or warmth/induration suggestive of infection'; very frail skin noted throughout with other ecchymosis scattered   HEAD: NCAT  EYES: NL inspection  ENT: MMM  NECK: Supple; non tender midline spine from cervical to lumbar  CARD: RRR  RESP: lungs CTA  ABD: S/NT no R/G  EXT: no pedal edema  NEURO: Grossly non-focal   PSYCH: Cooperative, appropriate.

## 2023-06-17 NOTE — ED PROVIDER NOTE - OBJECTIVE STATEMENT
89yo F with PMH of HLD presents to ED for eval of LLE lac. Recently recovering from covid and rebound after Paxlovid. Felt very tired yeterday with HHA while trying to go to bathroom and layed on floor - did not fall. No assoc CP, SOB, lightheadness. After resting a bit, stood up and felt better. While getting to floor, scraped LLE on tile edge she thinks. Had a skin tear that she went to  for who then referred her to ED. Has ambulated on leg. Denies fever, abd pain, NVDC though does endorse chronic GERD/gastritis.

## 2023-06-17 NOTE — ED ADULT TRIAGE NOTE - CHIEF COMPLAINT QUOTE
Pt AOX4 c/o laceration/skin tear to Left lower leg s/p fall at home yesterday; Pt recovering from ++Covid Dx'd 10 days ago; felt weak and was being helped to bathroom when she "needed to lay down" and her aide lowered her to floor and she scraped her leg on bathroom floor; wound was cleaned and dressed by EMS at home yesterday but pt did not go for higher level of care, went to Urgi[facility]care today for wound check and when they removed bandages pt was told to go to ER, takes no blood thinners; Hx of HTN

## 2023-06-17 NOTE — ED ADULT NURSE NOTE - OBJECTIVE STATEMENT
pt A&ox4 , coming to ED from urgent care. pt tripped and fell at home yesterday scrapping her left shin. pt has large skin tear to left shin, no active bleeding, or puss noted. pt denies fevers or chills at home. pt was seen at urgent care and instructed to come to ER. pt denies anticoagulation use, LOC, or hitting head. pmh HTN. pt denies Chest pain and SOB. pt denies H/A , Dizziness , lightheadedness , and radiating chest pain. breathing is spontaneous and unlabored. sating 99% on RA. bilateral pedal and radial pulses palpable and strong.  Bed in lowest position, call bell within reach, all other safety and comfort measures provided. family at bedside

## 2023-06-17 NOTE — ED ADULT NURSE REASSESSMENT NOTE - NS ED NURSE REASSESS COMMENT FT1
Patient is being discharged today. Left ED via cab accompanied by son. Education provided via teach back method and written materials to patient and pt verbalizes understanding. Medication reconciliation completed. All personal belongings with patient. No complaints/signs/symptoms of pain, distress, or discomfort at this time.

## 2023-06-17 NOTE — ED ADULT NURSE NOTE - NSFALLHARMRISKINTERV_ED_ALL_ED

## 2023-06-17 NOTE — ED PROVIDER NOTE - ATTENDING CONTRIBUTION TO CARE
I performed a history and physical exam of the patient and discussed their management with the resident and /or advanced care provider. I reviewed the resident and /or ACP's note and agree with the documented findings and plan of care. My medical decision making and observations are found above.  Lungs clear abd soft, large lt le wound

## 2023-06-17 NOTE — ED PROVIDER NOTE - PROGRESS NOTE DETAILS
Fernando PGY2: reviewed xray, no fx or foreign bodies visualized. wound dressed and covered, recs given to son and pt. Further dressing provided. Fernando PGY2: Pt's BP noted to be low, pt asymptomatic but on x3 meds. Recommended to hold amlodipine tonight and recheck BP in AM before taking any valsartan. PT and son agreeable. Will f/u with her PCP.

## 2023-06-17 NOTE — ED PROVIDER NOTE - NSFOLLOWUPINSTRUCTIONS_ED_ALL_ED_FT
Skin Tear    - Please keep area clean dry and covered while still wet and healing usually 2-3 days.  - Once scabbed and more dried out, can leave uncovered.   ---------------------------------------------------------------------------------------------------------------------  - Recommend changing dressing once a day or when soiled.  - Recommend PELON Non-stick pads on wound, then cover with gauze and wrap with Kerlix or gauze roll.   ---------------------------------------------------------------------------------------------------------------------  - Tylenol for pain as prescribed on bottle/box.  - Avoid further trauma to area, monitor for signs of infection  - Follow up next week with your PRIMARY CARE PROVIDER to monitor healing.   - Return for worsening symptoms, pain, inability to walk or signs of infection.

## 2023-06-17 NOTE — ED PROVIDER NOTE - CLINICAL SUMMARY MEDICAL DECISION MAKING FREE TEXT BOX
Fernando PGY2: 89yo F with PMH of HLD presents to ED for eval of LLE lac after scrap day before in bathroom. Currently no sxs of weakness, feels at baseline - recently recovered from covid, ambulated since injury. Xray to r/o fx but low suspicion. Will clean and dress wound and provide recs, dc home if neg. Unsure of last tdap, admin today, pain control. Fernando PGY2: 89yo F with PMH of HLD presents to ED for eval of LLE lac after scrap day before in bathroom. Currently no sxs of weakness, feels at baseline - recently recovered from covid, ambulated since injury. Xray to r/o fx but low suspicion. Will clean and dress wound and provide recs, dc home if neg. Unsure of last tdap, admin today, pain control.  Maryan: sent in for eval of LE wound from yesterday. no head trauma. + large superficial laceration on shin. nl sensation and pulses distally. No erythema not cellulitis. tetanus not UTD. will perform wound care. check xray. treat pain.

## 2023-06-17 NOTE — ED PROVIDER NOTE - PATIENT PORTAL LINK FT
You can access the FollowMyHealth Patient Portal offered by [facility] by registering at the following website: [email or web address]. By joining [employer]’s FollowMyHealth portal, you will also be able to view your health information using other applications (apps) compatible with our system.

## 2023-09-28 NOTE — DISCHARGE NOTE ADULT - FUNCTIONAL SCREEN CURRENT LEVEL: DRESSING, MLM
(2) assistive person eyeglasses/Normal vision: sees adequately in most situations; can see medication labels, newsprint

## 2023-10-03 ENCOUNTER — APPOINTMENT (OUTPATIENT)
Dept: GERIATRICS | Facility: CLINIC | Age: 88
End: 2023-10-03
Payer: MEDICARE

## 2023-10-03 VITALS
BODY MASS INDEX: 24.39 KG/M2 | HEIGHT: 59 IN | RESPIRATION RATE: 16 BRPM | HEART RATE: 74 BPM | SYSTOLIC BLOOD PRESSURE: 131 MMHG | TEMPERATURE: 97.6 F | OXYGEN SATURATION: 93 % | WEIGHT: 121 LBS | DIASTOLIC BLOOD PRESSURE: 64 MMHG

## 2023-10-03 DIAGNOSIS — Z87.39 PERSONAL HISTORY OF OTHER DISEASES OF THE MUSCULOSKELETAL SYSTEM AND CONNECTIVE TISSUE: ICD-10-CM

## 2023-10-03 DIAGNOSIS — Z63.4 DISAPPEARANCE AND DEATH OF FAMILY MEMBER: ICD-10-CM

## 2023-10-03 DIAGNOSIS — K21.9 GASTRO-ESOPHAGEAL REFLUX DISEASE W/OUT ESOPHAGITIS: ICD-10-CM

## 2023-10-03 DIAGNOSIS — I10 ESSENTIAL (PRIMARY) HYPERTENSION: ICD-10-CM

## 2023-10-03 DIAGNOSIS — E87.1 HYPO-OSMOLALITY AND HYPONATREMIA: ICD-10-CM

## 2023-10-03 DIAGNOSIS — U07.1 COVID-19: ICD-10-CM

## 2023-10-03 DIAGNOSIS — M54.50 LOW BACK PAIN, UNSPECIFIED: ICD-10-CM

## 2023-10-03 DIAGNOSIS — G89.29 LOW BACK PAIN, UNSPECIFIED: ICD-10-CM

## 2023-10-03 DIAGNOSIS — F41.9 ANXIETY DISORDER, UNSPECIFIED: ICD-10-CM

## 2023-10-03 DIAGNOSIS — C44.320 SQUAMOUS CELL CARCINOMA OF SKIN OF UNSPECIFIED PARTS OF FACE: ICD-10-CM

## 2023-10-03 DIAGNOSIS — R53.83 OTHER FATIGUE: ICD-10-CM

## 2023-10-03 DIAGNOSIS — H91.90 UNSPECIFIED HEARING LOSS, UNSPECIFIED EAR: ICD-10-CM

## 2023-10-03 DIAGNOSIS — E55.9 VITAMIN D DEFICIENCY, UNSPECIFIED: ICD-10-CM

## 2023-10-03 DIAGNOSIS — D64.9 ANEMIA, UNSPECIFIED: ICD-10-CM

## 2023-10-03 DIAGNOSIS — Z78.9 OTHER SPECIFIED HEALTH STATUS: ICD-10-CM

## 2023-10-03 DIAGNOSIS — Z74.09 OTHER REDUCED MOBILITY: ICD-10-CM

## 2023-10-03 DIAGNOSIS — Z82.0 FAMILY HISTORY OF EPILEPSY AND OTHER DISEASES OF THE NERVOUS SYSTEM: ICD-10-CM

## 2023-10-03 DIAGNOSIS — R41.3 OTHER AMNESIA: ICD-10-CM

## 2023-10-03 DIAGNOSIS — Z85.72 PERSONAL HISTORY OF NON-HODGKIN LYMPHOMAS: ICD-10-CM

## 2023-10-03 DIAGNOSIS — Z71.89 OTHER SPECIFIED COUNSELING: ICD-10-CM

## 2023-10-03 DIAGNOSIS — Z87.898 PERSONAL HISTORY OF OTHER SPECIFIED CONDITIONS: ICD-10-CM

## 2023-10-03 DIAGNOSIS — M81.0 AGE-RELATED OSTEOPOROSIS W/OUT CURRENT PATHOLOGICAL FRACTURE: ICD-10-CM

## 2023-10-03 DIAGNOSIS — Z74.1 NEED FOR ASSISTANCE WITH PERSONAL CARE: ICD-10-CM

## 2023-10-03 DIAGNOSIS — R35.1 NOCTURIA: ICD-10-CM

## 2023-10-03 DIAGNOSIS — H35.30 UNSPECIFIED MACULAR DEGENERATION: ICD-10-CM

## 2023-10-03 PROCEDURE — 99205 OFFICE O/P NEW HI 60 MIN: CPT | Mod: 25

## 2023-10-03 PROCEDURE — G2212 PROLONG OUTPT/OFFICE VIS: CPT

## 2023-10-03 RX ORDER — VALSARTAN AND HYDROCHLOROTHIAZIDE 160; 12.5 MG/1; MG/1
160-12.5 TABLET, FILM COATED ORAL DAILY
Qty: 90 | Refills: 1 | Status: ACTIVE | COMMUNITY
Start: 2023-10-03

## 2023-10-03 RX ORDER — FAMOTIDINE 20 MG/1
20 TABLET, FILM COATED ORAL DAILY
Qty: 30 | Refills: 3 | Status: ACTIVE | COMMUNITY
Start: 2023-10-03

## 2023-10-03 RX ORDER — SERTRALINE HYDROCHLORIDE 50 MG/1
50 TABLET, FILM COATED ORAL DAILY
Qty: 1 | Refills: 1 | Status: ACTIVE | COMMUNITY
Start: 2023-10-03

## 2023-10-03 RX ORDER — AMLODIPINE BESYLATE 5 MG/1
5 TABLET ORAL AT BEDTIME
Refills: 0 | Status: ACTIVE | COMMUNITY
Start: 2023-10-03

## 2023-10-03 RX ORDER — COLD-HOT PACK
125 MCG EACH MISCELLANEOUS
Qty: 90 | Refills: 3 | Status: ACTIVE | COMMUNITY
Start: 2023-10-03

## 2023-10-03 RX ORDER — VIT C/E/ZN/COPPR/LUTEIN/ZEAXAN 250MG-90MG
CAPSULE ORAL DAILY
Qty: 60 | Refills: 0 | Status: ACTIVE | COMMUNITY
Start: 2023-10-03

## 2023-10-03 RX ORDER — CARVEDILOL 6.25 MG/1
6.25 TABLET, FILM COATED ORAL TWICE DAILY
Qty: 180 | Refills: 3 | Status: ACTIVE | COMMUNITY
Start: 2023-10-03

## 2023-10-03 SDOH — SOCIAL STABILITY - SOCIAL INSECURITY: DISSAPEARANCE AND DEATH OF FAMILY MEMBER: Z63.4

## 2023-12-06 NOTE — PATIENT PROFILE ADULT. - AS SC BRADEN ACTIVITY
No protocol for requested medication     Last office visit date: 11/27/23  Pharmacy: [id number] - [place], [place] - [address]    Order pended, routed to clinician for review.     Refill Requested:  clonazePAM 1 MG Oral Tablet (KlonoPIN)      Recommended Follow Up: 3 months  No Show/Cancel: 0  Next visit: 2/7/2024    Controlled Med - Routed to Provider due to NO PROTOCOL. Orders have been prepped according to providers note.     Ordered date: 11/6/23  Last RX dispensed (per PDMP): 11/1 0/23      
(3) walks occasionally

## 2024-10-24 NOTE — PROVIDER CONTACT NOTE (OTHER) - BACKGROUND
Pt admitted for cellulitis hx of essential htn
patient admitted with cellulitis
Pt admitted for cellulitis hx of essential htn
Pt admitted for cellulitis hx of essential htn
yes

## 2025-03-12 NOTE — ED PROVIDER NOTE - RESPIRATORY, MLM
"31 year old  Chief Complaint   Patient presents with    Physical     Occ blood in stool, looks like clots. Internal hemorrhoids? For a couple of months corners of mouth are getting cracked, trying A&D ointment that seems to help but hasn't resolved. Worse with brushing teeth -- irritation from toothpaste? Angular chelitis? Look at a couple of moles. Spot above L eyebrow that is getting larger, dark moles on abdomen. Skin tag on groin that continues to grow, size of tip of pinky currently.     Follow Up     Requesting labs today. Diabetic foot exam.       Blood pressure 113/77, pulse 86, temperature 97.7  F (36.5  C), temperature source Temporal, resp. rate 16, height 1.644 m (5' 4.72\"), weight 115.9 kg (255 lb 8 oz), last menstrual period 02/12/2025, SpO2 97%. Body mass index is 42.88 kg/m .  Patient Active Problem List   Diagnosis    Depression    CAROLINE (generalized anxiety disorder)    Migraine with aura and without status migrainosus, not intractable    Non morbid obesity due to excess calories    Morbid obesity (H)    Diabetes mellitus, type 2 (H)       Wt Readings from Last 2 Encounters:   03/12/25 115.9 kg (255 lb 8 oz)   01/28/25 117.9 kg (260 lb)     BP Readings from Last 3 Encounters:   03/12/25 113/77   07/01/24 130/83   01/16/24 135/85         Current Outpatient Medications   Medication Sig Dispense Refill    buPROPion (WELLBUTRIN XL) 150 MG 24 hr tablet Take 1 tablet (150 mg) by mouth every morning 90 tablet 1    metFORMIN (GLUCOPHAGE XR) 500 MG 24 hr tablet Take 2 tablets (1,000 mg) by mouth daily (with dinner). 180 tablet 1    omeprazole (PRILOSEC) 20 MG DR capsule Take 1 capsule (20 mg) by mouth daily 30 capsule 0    Semaglutide, 1 MG/DOSE, (OZEMPIC) 4 MG/3ML pen Inject 1 mg subcutaneously every 7 days. 9 mL 2    sertraline (ZOLOFT) 50 MG tablet Take 1 tablet (50 mg) by mouth daily 90 tablet 1     No current facility-administered medications for this visit.       Social History     Tobacco Use    " "Smoking status: Never    Smokeless tobacco: Never   Vaping Use    Vaping status: Never Used   Substance Use Topics    Alcohol use: Yes     Comment: less than 1 drink a week.    Drug use: Yes     Types: Other     Comment: Delta 8 gummie, 5 mg, 4 times a week.       Health Maintenance Due   Topic Date Due    DIABETIC FOOT EXAM  Never done    Pneumococcal Vaccine: Pediatrics (0 to 5 Years) and At-Risk Patients (6 to 49 Years) (1 of 2 - PCV) Never done    YEARLY PREVENTIVE VISIT  08/15/2024    LIPID  08/15/2024    A1C  10/01/2024    MICROALBUMIN  01/16/2025    EYE EXAM  04/01/2025    PAP  08/08/2025       No results found for: \"PAP\"      March 12, 2025 9:17 AM    " Breath sounds clear and equal bilaterally.